# Patient Record
Sex: FEMALE | Race: WHITE | NOT HISPANIC OR LATINO | Employment: OTHER | URBAN - METROPOLITAN AREA
[De-identification: names, ages, dates, MRNs, and addresses within clinical notes are randomized per-mention and may not be internally consistent; named-entity substitution may affect disease eponyms.]

---

## 2017-06-22 ENCOUNTER — RECORDS - HEALTHEAST (OUTPATIENT)
Dept: LAB | Facility: CLINIC | Age: 72
End: 2017-06-22

## 2017-06-22 ENCOUNTER — RECORDS - HEALTHEAST (OUTPATIENT)
Dept: ADMINISTRATIVE | Facility: OTHER | Age: 72
End: 2017-06-22

## 2017-06-22 LAB
CHOLEST SERPL-MCNC: 221 MG/DL
FASTING STATUS PATIENT QL REPORTED: ABNORMAL
HDLC SERPL-MCNC: 60 MG/DL
LDLC SERPL CALC-MCNC: 144 MG/DL
TRIGL SERPL-MCNC: 83 MG/DL

## 2017-06-23 ENCOUNTER — HOSPITAL ENCOUNTER (OUTPATIENT)
Dept: MRI IMAGING | Facility: CLINIC | Age: 72
Discharge: HOME | End: 2017-06-23

## 2017-06-23 DIAGNOSIS — I67.1 CEREBRAL ANEURYSM: ICD-10-CM

## 2017-06-23 LAB — HCV AB SERPL QL IA: NEGATIVE

## 2017-06-26 ENCOUNTER — RECORDS - HEALTHEAST (OUTPATIENT)
Dept: ADMINISTRATIVE | Facility: OTHER | Age: 72
End: 2017-06-26

## 2018-03-21 ENCOUNTER — RECORDS - HEALTHEAST (OUTPATIENT)
Dept: LAB | Facility: CLINIC | Age: 73
End: 2018-03-21

## 2018-03-21 ENCOUNTER — RECORDS - HEALTHEAST (OUTPATIENT)
Dept: ADMINISTRATIVE | Facility: OTHER | Age: 73
End: 2018-03-21

## 2018-03-21 LAB
ALBUMIN SERPL-MCNC: 4 G/DL (ref 3.5–5)
ALP SERPL-CCNC: 74 U/L (ref 45–120)
ALT SERPL W P-5'-P-CCNC: 13 U/L (ref 0–45)
ANION GAP SERPL CALCULATED.3IONS-SCNC: 11 MMOL/L (ref 5–18)
AST SERPL W P-5'-P-CCNC: 13 U/L (ref 0–40)
BILIRUB SERPL-MCNC: 1.4 MG/DL (ref 0–1)
BUN SERPL-MCNC: 10 MG/DL (ref 8–28)
C REACTIVE PROTEIN LHE: 0.2 MG/DL (ref 0–0.8)
CALCIUM SERPL-MCNC: 9.8 MG/DL (ref 8.5–10.5)
CHLORIDE BLD-SCNC: 105 MMOL/L (ref 98–107)
CO2 SERPL-SCNC: 25 MMOL/L (ref 22–31)
CREAT SERPL-MCNC: 0.73 MG/DL (ref 0.6–1.1)
ERYTHROCYTE [SEDIMENTATION RATE] IN BLOOD BY WESTERGREN METHOD: 8 MM/HR (ref 0–20)
GFR SERPL CREATININE-BSD FRML MDRD: >60 ML/MIN/1.73M2
GLUCOSE BLD-MCNC: 98 MG/DL (ref 70–125)
POTASSIUM BLD-SCNC: 4.7 MMOL/L (ref 3.5–5)
PROT SERPL-MCNC: 6.9 G/DL (ref 6–8)
RHEUMATOID FACT SERPL-ACNC: <15 IU/ML (ref 0–30)
SODIUM SERPL-SCNC: 141 MMOL/L (ref 136–145)

## 2018-03-22 LAB — B BURGDOR IGG+IGM SER QL: 0.11 INDEX VALUE

## 2018-03-28 ENCOUNTER — HOSPITAL ENCOUNTER (OUTPATIENT)
Dept: MRI IMAGING | Facility: CLINIC | Age: 73
Discharge: HOME | End: 2018-03-28

## 2018-03-28 DIAGNOSIS — I67.1 CEREBRAL ANEURYSM: ICD-10-CM

## 2018-04-30 ENCOUNTER — RECORDS - HEALTHEAST (OUTPATIENT)
Dept: LAB | Facility: CLINIC | Age: 73
End: 2018-04-30

## 2018-04-30 LAB — POTASSIUM BLD-SCNC: 4.6 MMOL/L (ref 3.5–5)

## 2018-06-06 ENCOUNTER — RECORDS - HEALTHEAST (OUTPATIENT)
Dept: LAB | Facility: CLINIC | Age: 73
End: 2018-06-06

## 2018-06-06 LAB — POTASSIUM BLD-SCNC: 4.3 MMOL/L (ref 3.5–5)

## 2018-09-04 ENCOUNTER — HOSPITAL ENCOUNTER (OUTPATIENT)
Dept: ULTRASOUND IMAGING | Facility: CLINIC | Age: 73
Discharge: HOME | End: 2018-09-04
Attending: FAMILY MEDICINE

## 2018-09-04 ENCOUNTER — RECORDS - HEALTHEAST (OUTPATIENT)
Dept: ADMINISTRATIVE | Facility: OTHER | Age: 73
End: 2018-09-04

## 2018-09-04 ENCOUNTER — RECORDS - HEALTHEAST (OUTPATIENT)
Dept: LAB | Facility: CLINIC | Age: 73
End: 2018-09-04

## 2018-09-04 DIAGNOSIS — M79.662 PAIN OF LEFT CALF: ICD-10-CM

## 2018-09-04 DIAGNOSIS — M79.605 LEFT LEG PAIN: ICD-10-CM

## 2018-09-04 LAB
TSH SERPL DL<=0.005 MIU/L-ACNC: 0.66 UIU/ML (ref 0.3–5)
VIT B12 SERPL-MCNC: 385 PG/ML (ref 213–816)

## 2018-09-27 ENCOUNTER — RECORDS - HEALTHEAST (OUTPATIENT)
Dept: LAB | Facility: CLINIC | Age: 73
End: 2018-09-27

## 2018-09-27 LAB
ALBUMIN SERPL-MCNC: 3.9 G/DL (ref 3.5–5)
ALP SERPL-CCNC: 82 U/L (ref 45–120)
ALT SERPL W P-5'-P-CCNC: 12 U/L (ref 0–45)
ANION GAP SERPL CALCULATED.3IONS-SCNC: 10 MMOL/L (ref 5–18)
AST SERPL W P-5'-P-CCNC: 12 U/L (ref 0–40)
BASOPHILS # BLD AUTO: 0 THOU/UL (ref 0–0.2)
BASOPHILS NFR BLD AUTO: 1 % (ref 0–2)
BILIRUB SERPL-MCNC: 0.9 MG/DL (ref 0–1)
BUN SERPL-MCNC: 12 MG/DL (ref 8–28)
CALCIUM SERPL-MCNC: 9.8 MG/DL (ref 8.5–10.5)
CHLORIDE BLD-SCNC: 108 MMOL/L (ref 98–107)
CHOLEST SERPL-MCNC: 213 MG/DL
CO2 SERPL-SCNC: 26 MMOL/L (ref 22–31)
CREAT SERPL-MCNC: 0.79 MG/DL (ref 0.6–1.1)
EOSINOPHIL # BLD AUTO: 0.2 THOU/UL (ref 0–0.4)
EOSINOPHIL NFR BLD AUTO: 3 % (ref 0–6)
ERYTHROCYTE [DISTWIDTH] IN BLOOD BY AUTOMATED COUNT: 12.8 % (ref 11–14.5)
FASTING STATUS PATIENT QL REPORTED: YES
GFR SERPL CREATININE-BSD FRML MDRD: >60 ML/MIN/1.73M2
GLUCOSE BLD-MCNC: 94 MG/DL (ref 70–125)
HCT VFR BLD AUTO: 40.6 % (ref 35–47)
HDLC SERPL-MCNC: 69 MG/DL
HGB BLD-MCNC: 12.8 G/DL (ref 12–16)
LDLC SERPL CALC-MCNC: 132 MG/DL
LYMPHOCYTES # BLD AUTO: 1.8 THOU/UL (ref 0.8–4.4)
LYMPHOCYTES NFR BLD AUTO: 28 % (ref 20–40)
MCH RBC QN AUTO: 29.2 PG (ref 27–34)
MCHC RBC AUTO-ENTMCNC: 31.5 G/DL (ref 32–36)
MCV RBC AUTO: 93 FL (ref 80–100)
MONOCYTES # BLD AUTO: 0.4 THOU/UL (ref 0–0.9)
MONOCYTES NFR BLD AUTO: 7 % (ref 2–10)
NEUTROPHILS # BLD AUTO: 4 THOU/UL (ref 2–7.7)
NEUTROPHILS NFR BLD AUTO: 62 % (ref 50–70)
PLATELET # BLD AUTO: 293 THOU/UL (ref 140–440)
PMV BLD AUTO: 9.6 FL (ref 8.5–12.5)
POTASSIUM BLD-SCNC: 4.2 MMOL/L (ref 3.5–5)
PROT SERPL-MCNC: 6.5 G/DL (ref 6–8)
RBC # BLD AUTO: 4.38 MILL/UL (ref 3.8–5.4)
SODIUM SERPL-SCNC: 144 MMOL/L (ref 136–145)
TRIGL SERPL-MCNC: 58 MG/DL
WBC: 6.4 THOU/UL (ref 4–11)

## 2019-04-08 ENCOUNTER — RECORDS - HEALTHEAST (OUTPATIENT)
Dept: LAB | Facility: CLINIC | Age: 74
End: 2019-04-08

## 2019-04-08 LAB
ANION GAP SERPL CALCULATED.3IONS-SCNC: 10 MMOL/L (ref 5–18)
BUN SERPL-MCNC: 11 MG/DL (ref 8–28)
CALCIUM SERPL-MCNC: 10.2 MG/DL (ref 8.5–10.5)
CHLORIDE BLD-SCNC: 107 MMOL/L (ref 98–107)
CO2 SERPL-SCNC: 26 MMOL/L (ref 22–31)
CREAT SERPL-MCNC: 0.81 MG/DL (ref 0.6–1.1)
GFR SERPL CREATININE-BSD FRML MDRD: >60 ML/MIN/1.73M2
GLUCOSE BLD-MCNC: 83 MG/DL (ref 70–125)
POTASSIUM BLD-SCNC: 4 MMOL/L (ref 3.5–5)
SODIUM SERPL-SCNC: 143 MMOL/L (ref 136–145)

## 2019-07-01 ENCOUNTER — RECORDS - HEALTHEAST (OUTPATIENT)
Dept: LAB | Facility: CLINIC | Age: 74
End: 2019-07-01

## 2019-07-01 LAB
C REACTIVE PROTEIN LHE: 0.3 MG/DL (ref 0–0.8)
ERYTHROCYTE [SEDIMENTATION RATE] IN BLOOD BY WESTERGREN METHOD: 8 MM/HR (ref 0–20)
RHEUMATOID FACT SERPL-ACNC: <15 IU/ML (ref 0–30)
URATE SERPL-MCNC: 4.3 MG/DL (ref 2–7.5)

## 2019-07-02 LAB
ANA SER QL: 1.1 U
B BURGDOR IGG+IGM SER QL: 0.12 INDEX VALUE
CCP AB SER IA-ACNC: <0.5 U/ML

## 2019-07-05 LAB
B LOCUS: NORMAL
B27TEST METHOD: NORMAL

## 2019-10-01 ENCOUNTER — RECORDS - HEALTHEAST (OUTPATIENT)
Dept: LAB | Facility: CLINIC | Age: 74
End: 2019-10-01

## 2019-10-01 LAB
ALBUMIN SERPL-MCNC: 3.8 G/DL (ref 3.5–5)
ALP SERPL-CCNC: 79 U/L (ref 45–120)
ALT SERPL W P-5'-P-CCNC: 11 U/L (ref 0–45)
ANION GAP SERPL CALCULATED.3IONS-SCNC: 7 MMOL/L (ref 5–18)
AST SERPL W P-5'-P-CCNC: 12 U/L (ref 0–40)
BILIRUB SERPL-MCNC: 1.2 MG/DL (ref 0–1)
BUN SERPL-MCNC: 11 MG/DL (ref 8–28)
CALCIUM SERPL-MCNC: 9.5 MG/DL (ref 8.5–10.5)
CHLORIDE BLD-SCNC: 107 MMOL/L (ref 98–107)
CHOLEST SERPL-MCNC: 191 MG/DL
CO2 SERPL-SCNC: 27 MMOL/L (ref 22–31)
CREAT SERPL-MCNC: 0.84 MG/DL (ref 0.6–1.1)
FASTING STATUS PATIENT QL REPORTED: YES
GFR SERPL CREATININE-BSD FRML MDRD: >60 ML/MIN/1.73M2
GLUCOSE BLD-MCNC: 98 MG/DL (ref 70–125)
HDLC SERPL-MCNC: 68 MG/DL
LDLC SERPL CALC-MCNC: 104 MG/DL
POTASSIUM BLD-SCNC: 4 MMOL/L (ref 3.5–5)
PROT SERPL-MCNC: 6.2 G/DL (ref 6–8)
SODIUM SERPL-SCNC: 141 MMOL/L (ref 136–145)
TRIGL SERPL-MCNC: 97 MG/DL
TSH SERPL DL<=0.005 MIU/L-ACNC: 0.54 UIU/ML (ref 0.3–5)

## 2020-01-06 ENCOUNTER — RECORDS - HEALTHEAST (OUTPATIENT)
Dept: LAB | Facility: CLINIC | Age: 75
End: 2020-01-06

## 2020-01-06 ENCOUNTER — HOSPITAL ENCOUNTER (OUTPATIENT)
Dept: MRI IMAGING | Facility: CLINIC | Age: 75
Discharge: HOME | End: 2020-01-06
Attending: FAMILY MEDICINE

## 2020-01-06 ENCOUNTER — RECORDS - HEALTHEAST (OUTPATIENT)
Dept: ADMINISTRATIVE | Facility: OTHER | Age: 75
End: 2020-01-06

## 2020-01-06 DIAGNOSIS — I67.1 CEREBRAL ANEURYSM, NONRUPTURED: ICD-10-CM

## 2020-01-06 LAB
ALBUMIN SERPL-MCNC: 3.7 G/DL (ref 3.5–5)
ALP SERPL-CCNC: 67 U/L (ref 45–120)
ALT SERPL W P-5'-P-CCNC: 10 U/L (ref 0–45)
ANION GAP SERPL CALCULATED.3IONS-SCNC: 7 MMOL/L (ref 5–18)
AST SERPL W P-5'-P-CCNC: 11 U/L (ref 0–40)
BILIRUB SERPL-MCNC: 1.1 MG/DL (ref 0–1)
BUN SERPL-MCNC: 14 MG/DL (ref 8–28)
CALCIUM SERPL-MCNC: 9.1 MG/DL (ref 8.5–10.5)
CHLORIDE BLD-SCNC: 108 MMOL/L (ref 98–107)
CO2 SERPL-SCNC: 26 MMOL/L (ref 22–31)
CREAT SERPL-MCNC: 0.85 MG/DL (ref 0.6–1.1)
ERYTHROCYTE [SEDIMENTATION RATE] IN BLOOD BY WESTERGREN METHOD: 6 MM/HR (ref 0–20)
GFR SERPL CREATININE-BSD FRML MDRD: >60 ML/MIN/1.73M2
GLUCOSE BLD-MCNC: 99 MG/DL (ref 70–125)
LIPASE SERPL-CCNC: 145 U/L (ref 0–52)
POTASSIUM BLD-SCNC: 3.8 MMOL/L (ref 3.5–5)
PROT SERPL-MCNC: 6 G/DL (ref 6–8)
SODIUM SERPL-SCNC: 141 MMOL/L (ref 136–145)

## 2020-02-24 ENCOUNTER — RECORDS - HEALTHEAST (OUTPATIENT)
Dept: LAB | Facility: CLINIC | Age: 75
End: 2020-02-24

## 2020-02-24 LAB
C REACTIVE PROTEIN LHE: 0.2 MG/DL (ref 0–0.8)
ERYTHROCYTE [SEDIMENTATION RATE] IN BLOOD BY WESTERGREN METHOD: 8 MM/HR (ref 0–20)

## 2020-05-15 ENCOUNTER — RECORDS - HEALTHEAST (OUTPATIENT)
Dept: LAB | Facility: CLINIC | Age: 75
End: 2020-05-15

## 2020-05-15 LAB
ALBUMIN SERPL-MCNC: 3.7 G/DL (ref 3.5–5)
ALP SERPL-CCNC: 72 U/L (ref 45–120)
ALT SERPL W P-5'-P-CCNC: 16 U/L (ref 0–45)
ANION GAP SERPL CALCULATED.3IONS-SCNC: 9 MMOL/L (ref 5–18)
AST SERPL W P-5'-P-CCNC: 13 U/L (ref 0–40)
BILIRUB SERPL-MCNC: 0.6 MG/DL (ref 0–1)
BUN SERPL-MCNC: 9 MG/DL (ref 8–28)
CALCIUM SERPL-MCNC: 9.6 MG/DL (ref 8.5–10.5)
CHLORIDE BLD-SCNC: 107 MMOL/L (ref 98–107)
CO2 SERPL-SCNC: 26 MMOL/L (ref 22–31)
CREAT SERPL-MCNC: 0.81 MG/DL (ref 0.6–1.1)
GFR SERPL CREATININE-BSD FRML MDRD: >60 ML/MIN/1.73M2
GLUCOSE BLD-MCNC: 80 MG/DL (ref 70–125)
LIPASE SERPL-CCNC: 25 U/L (ref 0–52)
POTASSIUM BLD-SCNC: 4.3 MMOL/L (ref 3.5–5)
PROT SERPL-MCNC: 6.1 G/DL (ref 6–8)
SODIUM SERPL-SCNC: 142 MMOL/L (ref 136–145)

## 2020-08-26 ENCOUNTER — RECORDS - HEALTHEAST (OUTPATIENT)
Dept: LAB | Facility: CLINIC | Age: 75
End: 2020-08-26

## 2020-08-26 LAB
ALBUMIN SERPL-MCNC: 4.1 G/DL (ref 3.5–5)
ALP SERPL-CCNC: 75 U/L (ref 45–120)
ALT SERPL W P-5'-P-CCNC: 16 U/L (ref 0–45)
ANION GAP SERPL CALCULATED.3IONS-SCNC: 11 MMOL/L (ref 5–18)
AST SERPL W P-5'-P-CCNC: 14 U/L (ref 0–40)
BILIRUB SERPL-MCNC: 0.6 MG/DL (ref 0–1)
BUN SERPL-MCNC: 12 MG/DL (ref 8–28)
CALCIUM SERPL-MCNC: 9.6 MG/DL (ref 8.5–10.5)
CHLORIDE BLD-SCNC: 105 MMOL/L (ref 98–107)
CO2 SERPL-SCNC: 24 MMOL/L (ref 22–31)
CREAT SERPL-MCNC: 0.82 MG/DL (ref 0.6–1.1)
D DIMER PPP FEU-MCNC: 0.55 FEU UG/ML
GFR SERPL CREATININE-BSD FRML MDRD: >60 ML/MIN/1.73M2
GLUCOSE BLD-MCNC: 99 MG/DL (ref 70–125)
POTASSIUM BLD-SCNC: 4.4 MMOL/L (ref 3.5–5)
PROT SERPL-MCNC: 6.8 G/DL (ref 6–8)
SODIUM SERPL-SCNC: 140 MMOL/L (ref 136–145)

## 2020-10-12 ENCOUNTER — RECORDS - HEALTHEAST (OUTPATIENT)
Dept: LAB | Facility: CLINIC | Age: 75
End: 2020-10-12

## 2020-10-12 LAB
ALBUMIN SERPL-MCNC: 4.1 G/DL (ref 3.5–5)
ALP SERPL-CCNC: 81 U/L (ref 45–120)
ALT SERPL W P-5'-P-CCNC: 15 U/L (ref 0–45)
ANION GAP SERPL CALCULATED.3IONS-SCNC: 10 MMOL/L (ref 5–18)
AST SERPL W P-5'-P-CCNC: 14 U/L (ref 0–40)
BILIRUB SERPL-MCNC: 0.6 MG/DL (ref 0–1)
BUN SERPL-MCNC: 14 MG/DL (ref 8–28)
CALCIUM SERPL-MCNC: 9.7 MG/DL (ref 8.5–10.5)
CHLORIDE BLD-SCNC: 103 MMOL/L (ref 98–107)
CHOLEST SERPL-MCNC: 209 MG/DL
CO2 SERPL-SCNC: 26 MMOL/L (ref 22–31)
CREAT SERPL-MCNC: 0.8 MG/DL (ref 0.6–1.1)
FASTING STATUS PATIENT QL REPORTED: NO
GFR SERPL CREATININE-BSD FRML MDRD: >60 ML/MIN/1.73M2
GLUCOSE BLD-MCNC: 98 MG/DL (ref 70–125)
HDLC SERPL-MCNC: 71 MG/DL
LDLC SERPL CALC-MCNC: 118 MG/DL
LIPASE SERPL-CCNC: 22 U/L (ref 0–52)
POTASSIUM BLD-SCNC: 4.8 MMOL/L (ref 3.5–5)
PROT SERPL-MCNC: 6.6 G/DL (ref 6–8)
SODIUM SERPL-SCNC: 139 MMOL/L (ref 136–145)
TRIGL SERPL-MCNC: 100 MG/DL
TSH SERPL DL<=0.005 MIU/L-ACNC: 0.74 UIU/ML (ref 0.3–5)

## 2020-10-13 LAB — 25(OH)D3 SERPL-MCNC: 38.7 NG/ML (ref 30–80)

## 2021-04-22 ENCOUNTER — RECORDS - HEALTHEAST (OUTPATIENT)
Dept: LAB | Facility: CLINIC | Age: 76
End: 2021-04-22

## 2021-04-22 LAB
ALBUMIN SERPL-MCNC: 4.2 G/DL (ref 3.5–5)
ALP SERPL-CCNC: 92 U/L (ref 45–120)
ALT SERPL W P-5'-P-CCNC: 14 U/L (ref 0–45)
ANION GAP SERPL CALCULATED.3IONS-SCNC: 11 MMOL/L (ref 5–18)
AST SERPL W P-5'-P-CCNC: 13 U/L (ref 0–40)
BILIRUB SERPL-MCNC: 0.9 MG/DL (ref 0–1)
BUN SERPL-MCNC: 12 MG/DL (ref 8–28)
CALCIUM SERPL-MCNC: 10 MG/DL (ref 8.5–10.5)
CHLORIDE BLD-SCNC: 102 MMOL/L (ref 98–107)
CO2 SERPL-SCNC: 25 MMOL/L (ref 22–31)
CREAT SERPL-MCNC: 0.85 MG/DL (ref 0.6–1.1)
D DIMER PPP FEU-MCNC: 0.64 FEU UG/ML
GFR SERPL CREATININE-BSD FRML MDRD: >60 ML/MIN/1.73M2
GLUCOSE BLD-MCNC: 86 MG/DL (ref 70–125)
POTASSIUM BLD-SCNC: 4.2 MMOL/L (ref 3.5–5)
PROT SERPL-MCNC: 7 G/DL (ref 6–8)
SODIUM SERPL-SCNC: 138 MMOL/L (ref 136–145)
TSH SERPL DL<=0.005 MIU/L-ACNC: 0.7 UIU/ML (ref 0.3–5)

## 2021-04-23 ENCOUNTER — RECORDS - HEALTHEAST (OUTPATIENT)
Dept: ADMINISTRATIVE | Facility: OTHER | Age: 76
End: 2021-04-23

## 2021-04-27 ENCOUNTER — RECORDS - HEALTHEAST (OUTPATIENT)
Dept: ADMINISTRATIVE | Facility: OTHER | Age: 76
End: 2021-04-27

## 2021-04-28 ENCOUNTER — RECORDS - HEALTHEAST (OUTPATIENT)
Dept: ADMINISTRATIVE | Facility: OTHER | Age: 76
End: 2021-04-28

## 2021-04-29 ENCOUNTER — HOSPITAL ENCOUNTER (OUTPATIENT)
Dept: CARDIOLOGY | Facility: CLINIC | Age: 76
Discharge: HOME | End: 2021-04-29

## 2021-04-29 DIAGNOSIS — R07.2 SUBSTERNAL CHEST PAIN: ICD-10-CM

## 2021-04-29 LAB
CV STRESS CURRENT BP HE: NORMAL
CV STRESS CURRENT HR HE: 101
CV STRESS CURRENT HR HE: 101
CV STRESS CURRENT HR HE: 106
CV STRESS CURRENT HR HE: 108
CV STRESS CURRENT HR HE: 113
CV STRESS CURRENT HR HE: 113
CV STRESS CURRENT HR HE: 126
CV STRESS CURRENT HR HE: 135
CV STRESS CURRENT HR HE: 137
CV STRESS CURRENT HR HE: 137
CV STRESS CURRENT HR HE: 143
CV STRESS CURRENT HR HE: 146
CV STRESS CURRENT HR HE: 149
CV STRESS CURRENT HR HE: 149
CV STRESS CURRENT HR HE: 154
CV STRESS CURRENT HR HE: 155
CV STRESS CURRENT HR HE: 155
CV STRESS CURRENT HR HE: 86
CV STRESS CURRENT HR HE: 95
CV STRESS CURRENT HR HE: 95
CV STRESS CURRENT HR HE: 96
CV STRESS CURRENT HR HE: 96
CV STRESS CURRENT HR HE: 98
CV STRESS DEVIATION TIME HE: NORMAL
CV STRESS ECHO PERCENT HR HE: NORMAL
CV STRESS EXERCISE STAGE HE: NORMAL
CV STRESS FINAL RESTING BP HE: NORMAL
CV STRESS FINAL RESTING HR HE: 96
CV STRESS MAX HR HE: 159
CV STRESS MAX TREADMILL GRADE HE: 12
CV STRESS MAX TREADMILL SPEED HE: 2.5
CV STRESS PEAK DIA BP HE: NORMAL
CV STRESS PEAK SYS BP HE: NORMAL
CV STRESS PHASE HE: NORMAL
CV STRESS PROTOCOL HE: NORMAL
CV STRESS RESTING PT POSITION HE: NORMAL
CV STRESS RESTING PT POSITION HE: NORMAL
CV STRESS ST DEVIATION AMOUNT HE: NORMAL
CV STRESS ST DEVIATION ELEVATION HE: NORMAL
CV STRESS ST EVELATION AMOUNT HE: NORMAL
CV STRESS TEST TYPE HE: NORMAL
CV STRESS TOTAL STAGE TIME MIN 1 HE: NORMAL
RATE PRESSURE PRODUCT: NORMAL
STRESS ECHO BASELINE DIASTOLIC HE: 101
STRESS ECHO BASELINE HR: 86
STRESS ECHO BASELINE SYSTOLIC BP: 176
STRESS ECHO CALCULATED PERCENT HR: 110 %
STRESS ECHO LAST STRESS DIASTOLIC BP: 90
STRESS ECHO LAST STRESS HR: 155
STRESS ECHO LAST STRESS SYSTOLIC BP: 170
STRESS ECHO POST ESTIMATED WORKLOAD: 6
STRESS ECHO POST EXERCISE DUR MIN: 4
STRESS ECHO POST EXERCISE DUR SEC: 15
STRESS ECHO TARGET HR: 145

## 2021-04-29 RX ORDER — TRIAMTERENE AND HYDROCHLOROTHIAZIDE 37.5; 25 MG/1; MG/1
1 CAPSULE ORAL EVERY MORNING
Status: SHIPPED | COMMUNITY
Start: 2021-04-29

## 2021-04-29 RX ORDER — ESTRADIOL 0.1 MG/G
2 CREAM VAGINAL DAILY
Status: SHIPPED | COMMUNITY
Start: 2021-04-29

## 2021-04-29 RX ORDER — LOSARTAN POTASSIUM 100 MG/1
100 TABLET ORAL DAILY
Status: SHIPPED | COMMUNITY
Start: 2021-04-29

## 2021-04-29 RX ORDER — AMLODIPINE BESYLATE 5 MG/1
5 TABLET ORAL DAILY
Status: SHIPPED | COMMUNITY
Start: 2021-04-29

## 2021-04-29 RX ORDER — DOCUSATE SODIUM 100 MG/1
100 CAPSULE, LIQUID FILLED ORAL 2 TIMES DAILY
Status: SHIPPED | COMMUNITY
Start: 2021-04-29

## 2021-05-22 ENCOUNTER — HEALTH MAINTENANCE LETTER (OUTPATIENT)
Age: 76
End: 2021-05-22

## 2021-05-26 ENCOUNTER — RECORDS - HEALTHEAST (OUTPATIENT)
Dept: ADMINISTRATIVE | Facility: CLINIC | Age: 76
End: 2021-05-26

## 2021-05-29 ENCOUNTER — RECORDS - HEALTHEAST (OUTPATIENT)
Dept: ADMINISTRATIVE | Facility: CLINIC | Age: 76
End: 2021-05-29

## 2021-06-01 ENCOUNTER — RECORDS - HEALTHEAST (OUTPATIENT)
Dept: ADMINISTRATIVE | Facility: CLINIC | Age: 76
End: 2021-06-01

## 2021-06-15 ENCOUNTER — RECORDS - HEALTHEAST (OUTPATIENT)
Dept: LAB | Facility: CLINIC | Age: 76
End: 2021-06-15

## 2021-06-15 LAB
ERYTHROCYTE [SEDIMENTATION RATE] IN BLOOD BY WESTERGREN METHOD: 13 MM/HR (ref 0–20)
FOLATE SERPL-MCNC: 10 NG/ML
VIT B12 SERPL-MCNC: 371 PG/ML (ref 213–816)

## 2021-09-11 ENCOUNTER — HEALTH MAINTENANCE LETTER (OUTPATIENT)
Age: 76
End: 2021-09-11

## 2021-11-09 ENCOUNTER — LAB REQUISITION (OUTPATIENT)
Dept: LAB | Facility: CLINIC | Age: 76
End: 2021-11-09

## 2021-11-09 DIAGNOSIS — E78.5 HYPERLIPIDEMIA, UNSPECIFIED: ICD-10-CM

## 2021-11-09 DIAGNOSIS — R89.9 UNSPECIFIED ABNORMAL FINDING IN SPECIMENS FROM OTHER ORGANS, SYSTEMS AND TISSUES: ICD-10-CM

## 2021-11-09 DIAGNOSIS — I10 ESSENTIAL (PRIMARY) HYPERTENSION: ICD-10-CM

## 2021-11-09 DIAGNOSIS — E03.9 HYPOTHYROIDISM, UNSPECIFIED: ICD-10-CM

## 2021-11-09 LAB
ALBUMIN SERPL-MCNC: 4 G/DL (ref 3.5–5)
ALP SERPL-CCNC: 84 U/L (ref 45–120)
ALT SERPL W P-5'-P-CCNC: 38 U/L (ref 0–45)
AMYLASE SERPL-CCNC: 52 U/L (ref 5–120)
ANION GAP SERPL CALCULATED.3IONS-SCNC: 12 MMOL/L (ref 5–18)
AST SERPL W P-5'-P-CCNC: 20 U/L (ref 0–40)
BILIRUB SERPL-MCNC: 0.9 MG/DL (ref 0–1)
BUN SERPL-MCNC: 11 MG/DL (ref 8–28)
CALCIUM SERPL-MCNC: 9.9 MG/DL (ref 8.5–10.5)
CHLORIDE BLD-SCNC: 104 MMOL/L (ref 98–107)
CHOLEST SERPL-MCNC: 190 MG/DL
CO2 SERPL-SCNC: 23 MMOL/L (ref 22–31)
CREAT SERPL-MCNC: 0.83 MG/DL (ref 0.6–1.1)
GFR SERPL CREATININE-BSD FRML MDRD: 69 ML/MIN/1.73M2
GLUCOSE BLD-MCNC: 99 MG/DL (ref 70–125)
HDLC SERPL-MCNC: 59 MG/DL
LDLC SERPL CALC-MCNC: 114 MG/DL
LIPASE SERPL-CCNC: 20 U/L (ref 0–52)
POTASSIUM BLD-SCNC: 4.7 MMOL/L (ref 3.5–5)
PROT SERPL-MCNC: 6.7 G/DL (ref 6–8)
SODIUM SERPL-SCNC: 139 MMOL/L (ref 136–145)
TRIGL SERPL-MCNC: 86 MG/DL
TSH SERPL DL<=0.005 MIU/L-ACNC: 0.82 UIU/ML (ref 0.3–5)

## 2021-11-09 PROCEDURE — 84443 ASSAY THYROID STIM HORMONE: CPT | Performed by: FAMILY MEDICINE

## 2021-11-09 PROCEDURE — 83690 ASSAY OF LIPASE: CPT | Performed by: FAMILY MEDICINE

## 2021-11-09 PROCEDURE — 82040 ASSAY OF SERUM ALBUMIN: CPT | Performed by: FAMILY MEDICINE

## 2021-11-09 PROCEDURE — 80061 LIPID PANEL: CPT | Performed by: FAMILY MEDICINE

## 2021-11-09 PROCEDURE — 82150 ASSAY OF AMYLASE: CPT | Performed by: FAMILY MEDICINE

## 2021-12-24 ENCOUNTER — HOSPITAL ENCOUNTER (OUTPATIENT)
Dept: MRI IMAGING | Facility: CLINIC | Age: 76
Discharge: HOME | End: 2021-12-24
Attending: FAMILY MEDICINE | Admitting: FAMILY MEDICINE
Payer: COMMERCIAL

## 2021-12-24 DIAGNOSIS — I67.1 CEREBRAL ANEURYSM: ICD-10-CM

## 2021-12-24 PROCEDURE — 70544 MR ANGIOGRAPHY HEAD W/O DYE: CPT

## 2022-04-15 ENCOUNTER — LAB REQUISITION (OUTPATIENT)
Dept: LAB | Facility: CLINIC | Age: 77
End: 2022-04-15
Payer: COMMERCIAL

## 2022-04-15 DIAGNOSIS — R53.83 OTHER FATIGUE: ICD-10-CM

## 2022-04-15 DIAGNOSIS — M79.10 MYALGIA, UNSPECIFIED SITE: ICD-10-CM

## 2022-04-15 LAB
ALBUMIN SERPL-MCNC: 4.2 G/DL (ref 3.5–5)
ALP SERPL-CCNC: 73 U/L (ref 45–120)
ALT SERPL W P-5'-P-CCNC: 16 U/L (ref 0–45)
ANION GAP SERPL CALCULATED.3IONS-SCNC: 12 MMOL/L (ref 5–18)
AST SERPL W P-5'-P-CCNC: 14 U/L (ref 0–40)
BILIRUB SERPL-MCNC: 1 MG/DL (ref 0–1)
BUN SERPL-MCNC: 12 MG/DL (ref 8–28)
CALCIUM SERPL-MCNC: 9.8 MG/DL (ref 8.5–10.5)
CHLORIDE BLD-SCNC: 107 MMOL/L (ref 98–107)
CK SERPL-CCNC: 51 U/L (ref 30–190)
CO2 SERPL-SCNC: 25 MMOL/L (ref 22–31)
CREAT SERPL-MCNC: 0.81 MG/DL (ref 0.6–1.1)
CRP SERPL HS-MCNC: 2 MG/L (ref 0–3)
GFR SERPL CREATININE-BSD FRML MDRD: 75 ML/MIN/1.73M2
GLUCOSE BLD-MCNC: 91 MG/DL (ref 70–125)
POTASSIUM BLD-SCNC: 4.2 MMOL/L (ref 3.5–5)
PROT SERPL-MCNC: 6.6 G/DL (ref 6–8)
SODIUM SERPL-SCNC: 144 MMOL/L (ref 136–145)
TSH SERPL DL<=0.005 MIU/L-ACNC: 0.88 UIU/ML (ref 0.3–5)
VIT B12 SERPL-MCNC: 395 PG/ML (ref 213–816)

## 2022-04-15 PROCEDURE — 82550 ASSAY OF CK (CPK): CPT | Mod: ORL | Performed by: FAMILY MEDICINE

## 2022-04-15 PROCEDURE — 82607 VITAMIN B-12: CPT | Mod: ORL | Performed by: FAMILY MEDICINE

## 2022-04-15 PROCEDURE — 80053 COMPREHEN METABOLIC PANEL: CPT | Mod: ORL | Performed by: FAMILY MEDICINE

## 2022-04-15 PROCEDURE — 84443 ASSAY THYROID STIM HORMONE: CPT | Mod: ORL | Performed by: FAMILY MEDICINE

## 2022-04-15 PROCEDURE — 86141 C-REACTIVE PROTEIN HS: CPT | Mod: ORL | Performed by: FAMILY MEDICINE

## 2022-05-31 ENCOUNTER — ANCILLARY PROCEDURE (OUTPATIENT)
Dept: BONE DENSITY | Facility: CLINIC | Age: 77
End: 2022-05-31
Attending: FAMILY MEDICINE
Payer: COMMERCIAL

## 2022-05-31 DIAGNOSIS — Z78.0 POSTMENOPAUSAL: ICD-10-CM

## 2022-05-31 PROCEDURE — 77080 DXA BONE DENSITY AXIAL: CPT | Mod: TC | Performed by: RADIOLOGY

## 2022-06-17 ENCOUNTER — LAB REQUISITION (OUTPATIENT)
Dept: LAB | Facility: CLINIC | Age: 77
End: 2022-06-17
Payer: COMMERCIAL

## 2022-06-17 DIAGNOSIS — I10 ESSENTIAL (PRIMARY) HYPERTENSION: ICD-10-CM

## 2022-06-17 LAB
ANION GAP SERPL CALCULATED.3IONS-SCNC: 10 MMOL/L (ref 5–18)
BUN SERPL-MCNC: 13 MG/DL (ref 8–28)
CALCIUM SERPL-MCNC: 9.7 MG/DL (ref 8.5–10.5)
CHLORIDE BLD-SCNC: 104 MMOL/L (ref 98–107)
CO2 SERPL-SCNC: 26 MMOL/L (ref 22–31)
CREAT SERPL-MCNC: 0.79 MG/DL (ref 0.6–1.1)
GFR SERPL CREATININE-BSD FRML MDRD: 77 ML/MIN/1.73M2
GLUCOSE BLD-MCNC: 95 MG/DL (ref 70–125)
POTASSIUM BLD-SCNC: 4 MMOL/L (ref 3.5–5)
SODIUM SERPL-SCNC: 140 MMOL/L (ref 136–145)

## 2022-06-17 PROCEDURE — 80048 BASIC METABOLIC PNL TOTAL CA: CPT | Mod: ORL | Performed by: FAMILY MEDICINE

## 2022-06-18 ENCOUNTER — HEALTH MAINTENANCE LETTER (OUTPATIENT)
Age: 77
End: 2022-06-18

## 2022-07-19 ENCOUNTER — LAB REQUISITION (OUTPATIENT)
Dept: LAB | Facility: CLINIC | Age: 77
End: 2022-07-19
Payer: COMMERCIAL

## 2022-07-19 DIAGNOSIS — J34.89 OTHER SPECIFIED DISORDERS OF NOSE AND NASAL SINUSES: ICD-10-CM

## 2022-07-19 PROCEDURE — 87081 CULTURE SCREEN ONLY: CPT | Mod: ORL | Performed by: FAMILY MEDICINE

## 2022-07-19 PROCEDURE — U0003 INFECTIOUS AGENT DETECTION BY NUCLEIC ACID (DNA OR RNA); SEVERE ACUTE RESPIRATORY SYNDROME CORONAVIRUS 2 (SARS-COV-2) (CORONAVIRUS DISEASE [COVID-19]), AMPLIFIED PROBE TECHNIQUE, MAKING USE OF HIGH THROUGHPUT TECHNOLOGIES AS DESCRIBED BY CMS-2020-01-R: HCPCS | Mod: ORL | Performed by: FAMILY MEDICINE

## 2022-07-20 LAB — SARS-COV-2 RNA RESP QL NAA+PROBE: POSITIVE

## 2022-07-22 LAB — BACTERIA SPEC CULT: NORMAL

## 2022-10-30 ENCOUNTER — HEALTH MAINTENANCE LETTER (OUTPATIENT)
Age: 77
End: 2022-10-30

## 2022-11-11 ENCOUNTER — LAB REQUISITION (OUTPATIENT)
Dept: LAB | Facility: CLINIC | Age: 77
End: 2022-11-11
Payer: COMMERCIAL

## 2022-11-11 DIAGNOSIS — I10 ESSENTIAL (PRIMARY) HYPERTENSION: ICD-10-CM

## 2022-11-11 DIAGNOSIS — E78.00 PURE HYPERCHOLESTEROLEMIA, UNSPECIFIED: ICD-10-CM

## 2022-11-11 DIAGNOSIS — G62.9 POLYNEUROPATHY, UNSPECIFIED: ICD-10-CM

## 2022-11-11 DIAGNOSIS — E04.2 NONTOXIC MULTINODULAR GOITER: ICD-10-CM

## 2022-11-11 DIAGNOSIS — R35.0 FREQUENCY OF MICTURITION: ICD-10-CM

## 2022-11-11 LAB
ANION GAP SERPL CALCULATED.3IONS-SCNC: 14 MMOL/L (ref 7–15)
BUN SERPL-MCNC: 17.4 MG/DL (ref 8–23)
CALCIUM SERPL-MCNC: 9.9 MG/DL (ref 8.8–10.2)
CHLORIDE SERPL-SCNC: 103 MMOL/L (ref 98–107)
CHOLEST SERPL-MCNC: 205 MG/DL
CREAT SERPL-MCNC: 1.13 MG/DL (ref 0.51–0.95)
DEPRECATED HCO3 PLAS-SCNC: 24 MMOL/L (ref 22–29)
GFR SERPL CREATININE-BSD FRML MDRD: 50 ML/MIN/1.73M2
GLUCOSE SERPL-MCNC: 100 MG/DL (ref 70–99)
HDLC SERPL-MCNC: 70 MG/DL
LDLC SERPL CALC-MCNC: 124 MG/DL
NONHDLC SERPL-MCNC: 135 MG/DL
POTASSIUM SERPL-SCNC: 4.4 MMOL/L (ref 3.4–5.3)
SODIUM SERPL-SCNC: 141 MMOL/L (ref 136–145)
TRIGL SERPL-MCNC: 57 MG/DL
TSH SERPL DL<=0.005 MIU/L-ACNC: 1.05 UIU/ML (ref 0.3–4.2)
VIT B12 SERPL-MCNC: 385 PG/ML (ref 232–1245)

## 2022-11-11 PROCEDURE — 80048 BASIC METABOLIC PNL TOTAL CA: CPT | Mod: ORL | Performed by: FAMILY MEDICINE

## 2022-11-11 PROCEDURE — 82607 VITAMIN B-12: CPT | Mod: ORL | Performed by: FAMILY MEDICINE

## 2022-11-11 PROCEDURE — 87086 URINE CULTURE/COLONY COUNT: CPT | Mod: ORL | Performed by: FAMILY MEDICINE

## 2022-11-11 PROCEDURE — 80061 LIPID PANEL: CPT | Mod: ORL | Performed by: FAMILY MEDICINE

## 2022-11-11 PROCEDURE — 84443 ASSAY THYROID STIM HORMONE: CPT | Mod: ORL | Performed by: FAMILY MEDICINE

## 2022-11-13 LAB — BACTERIA UR CULT: NORMAL

## 2022-12-27 ENCOUNTER — LAB REQUISITION (OUTPATIENT)
Dept: LAB | Facility: CLINIC | Age: 77
End: 2022-12-27
Payer: COMMERCIAL

## 2022-12-27 DIAGNOSIS — I10 ESSENTIAL (PRIMARY) HYPERTENSION: ICD-10-CM

## 2022-12-27 LAB
ANION GAP SERPL CALCULATED.3IONS-SCNC: 14 MMOL/L (ref 7–15)
BUN SERPL-MCNC: 15.1 MG/DL (ref 8–23)
CALCIUM SERPL-MCNC: 9.8 MG/DL (ref 8.8–10.2)
CHLORIDE SERPL-SCNC: 104 MMOL/L (ref 98–107)
CREAT SERPL-MCNC: 0.88 MG/DL (ref 0.51–0.95)
DEPRECATED HCO3 PLAS-SCNC: 24 MMOL/L (ref 22–29)
GFR SERPL CREATININE-BSD FRML MDRD: 67 ML/MIN/1.73M2
GLUCOSE SERPL-MCNC: 124 MG/DL (ref 70–99)
POTASSIUM SERPL-SCNC: 4.1 MMOL/L (ref 3.4–5.3)
SODIUM SERPL-SCNC: 142 MMOL/L (ref 136–145)

## 2022-12-27 PROCEDURE — 80048 BASIC METABOLIC PNL TOTAL CA: CPT | Mod: ORL | Performed by: FAMILY MEDICINE

## 2023-04-20 ENCOUNTER — LAB REQUISITION (OUTPATIENT)
Dept: LAB | Facility: CLINIC | Age: 78
End: 2023-04-20
Payer: COMMERCIAL

## 2023-04-20 DIAGNOSIS — R52 PAIN, UNSPECIFIED: ICD-10-CM

## 2023-04-20 LAB
ALBUMIN SERPL BCG-MCNC: 4.7 G/DL (ref 3.5–5.2)
ALP SERPL-CCNC: 90 U/L (ref 35–104)
ALT SERPL W P-5'-P-CCNC: 14 U/L (ref 10–35)
ANION GAP SERPL CALCULATED.3IONS-SCNC: 12 MMOL/L (ref 7–15)
AST SERPL W P-5'-P-CCNC: 14 U/L (ref 10–35)
BILIRUB SERPL-MCNC: 0.6 MG/DL
BUN SERPL-MCNC: 13.7 MG/DL (ref 8–23)
CALCIUM SERPL-MCNC: 10.2 MG/DL (ref 8.8–10.2)
CHLORIDE SERPL-SCNC: 100 MMOL/L (ref 98–107)
CREAT SERPL-MCNC: 0.94 MG/DL (ref 0.51–0.95)
DEPRECATED HCO3 PLAS-SCNC: 24 MMOL/L (ref 22–29)
ERYTHROCYTE [SEDIMENTATION RATE] IN BLOOD BY WESTERGREN METHOD: 12 MM/HR (ref 0–30)
GFR SERPL CREATININE-BSD FRML MDRD: 62 ML/MIN/1.73M2
GLUCOSE SERPL-MCNC: 95 MG/DL (ref 70–99)
POTASSIUM SERPL-SCNC: 3.9 MMOL/L (ref 3.4–5.3)
PROT SERPL-MCNC: 6.8 G/DL (ref 6.4–8.3)
SODIUM SERPL-SCNC: 136 MMOL/L (ref 136–145)

## 2023-04-20 PROCEDURE — 85652 RBC SED RATE AUTOMATED: CPT | Mod: ORL | Performed by: FAMILY MEDICINE

## 2023-04-20 PROCEDURE — 80053 COMPREHEN METABOLIC PANEL: CPT | Mod: ORL | Performed by: FAMILY MEDICINE

## 2023-06-25 ENCOUNTER — HEALTH MAINTENANCE LETTER (OUTPATIENT)
Age: 78
End: 2023-06-25

## 2023-12-26 ENCOUNTER — LAB REQUISITION (OUTPATIENT)
Dept: LAB | Facility: CLINIC | Age: 78
End: 2023-12-26
Payer: COMMERCIAL

## 2023-12-26 DIAGNOSIS — I10 ESSENTIAL (PRIMARY) HYPERTENSION: ICD-10-CM

## 2023-12-26 DIAGNOSIS — E78.5 HYPERLIPIDEMIA, UNSPECIFIED: ICD-10-CM

## 2023-12-26 DIAGNOSIS — E04.2 NONTOXIC MULTINODULAR GOITER: ICD-10-CM

## 2023-12-26 DIAGNOSIS — G62.9 POLYNEUROPATHY, UNSPECIFIED: ICD-10-CM

## 2023-12-26 LAB
ALBUMIN SERPL BCG-MCNC: 4.1 G/DL (ref 3.5–5.2)
ALP SERPL-CCNC: 87 U/L (ref 40–150)
ALT SERPL W P-5'-P-CCNC: 9 U/L (ref 0–50)
ANION GAP SERPL CALCULATED.3IONS-SCNC: 15 MMOL/L (ref 7–15)
AST SERPL W P-5'-P-CCNC: 22 U/L (ref 0–45)
BILIRUB SERPL-MCNC: 0.7 MG/DL
BUN SERPL-MCNC: 16.5 MG/DL (ref 8–23)
CALCIUM SERPL-MCNC: 9.5 MG/DL (ref 8.8–10.2)
CHLORIDE SERPL-SCNC: 104 MMOL/L (ref 98–107)
CHOLEST SERPL-MCNC: 187 MG/DL
CREAT SERPL-MCNC: 0.86 MG/DL (ref 0.51–0.95)
CREAT UR-MCNC: 42.4 MG/DL
DEPRECATED HCO3 PLAS-SCNC: 20 MMOL/L (ref 22–29)
EGFRCR SERPLBLD CKD-EPI 2021: 69 ML/MIN/1.73M2
FASTING STATUS PATIENT QL REPORTED: ABNORMAL
GLUCOSE SERPL-MCNC: 102 MG/DL (ref 70–99)
HDLC SERPL-MCNC: 66 MG/DL
LDLC SERPL CALC-MCNC: 102 MG/DL
MICROALBUMIN UR-MCNC: <12 MG/L
MICROALBUMIN/CREAT UR: NORMAL MG/G{CREAT}
NONHDLC SERPL-MCNC: 121 MG/DL
POTASSIUM SERPL-SCNC: 4.2 MMOL/L (ref 3.4–5.3)
PROT SERPL-MCNC: 6.4 G/DL (ref 6.4–8.3)
SODIUM SERPL-SCNC: 139 MMOL/L (ref 135–145)
TRIGL SERPL-MCNC: 96 MG/DL
TSH SERPL DL<=0.005 MIU/L-ACNC: 0.85 UIU/ML (ref 0.3–4.2)
VIT B12 SERPL-MCNC: 345 PG/ML (ref 232–1245)

## 2023-12-26 PROCEDURE — 80053 COMPREHEN METABOLIC PANEL: CPT | Mod: ORL | Performed by: FAMILY MEDICINE

## 2023-12-26 PROCEDURE — 82043 UR ALBUMIN QUANTITATIVE: CPT | Mod: ORL | Performed by: FAMILY MEDICINE

## 2023-12-26 PROCEDURE — 82607 VITAMIN B-12: CPT | Mod: ORL | Performed by: FAMILY MEDICINE

## 2023-12-26 PROCEDURE — 84443 ASSAY THYROID STIM HORMONE: CPT | Mod: ORL | Performed by: FAMILY MEDICINE

## 2023-12-26 PROCEDURE — 80061 LIPID PANEL: CPT | Mod: ORL | Performed by: FAMILY MEDICINE

## 2024-06-27 ENCOUNTER — LAB REQUISITION (OUTPATIENT)
Dept: LAB | Facility: CLINIC | Age: 79
End: 2024-06-27

## 2024-06-27 DIAGNOSIS — I10 ESSENTIAL (PRIMARY) HYPERTENSION: ICD-10-CM

## 2024-06-27 PROCEDURE — 80048 BASIC METABOLIC PNL TOTAL CA: CPT | Performed by: FAMILY MEDICINE

## 2024-06-28 LAB
ANION GAP SERPL CALCULATED.3IONS-SCNC: 12 MMOL/L (ref 7–15)
BUN SERPL-MCNC: 10.9 MG/DL (ref 8–23)
CALCIUM SERPL-MCNC: 9.5 MG/DL (ref 8.8–10.2)
CHLORIDE SERPL-SCNC: 103 MMOL/L (ref 98–107)
CREAT SERPL-MCNC: 0.88 MG/DL (ref 0.51–0.95)
DEPRECATED HCO3 PLAS-SCNC: 24 MMOL/L (ref 22–29)
EGFRCR SERPLBLD CKD-EPI 2021: 67 ML/MIN/1.73M2
GLUCOSE SERPL-MCNC: 95 MG/DL (ref 70–99)
POTASSIUM SERPL-SCNC: 4 MMOL/L (ref 3.4–5.3)
SODIUM SERPL-SCNC: 139 MMOL/L (ref 135–145)

## 2024-07-29 ENCOUNTER — LAB REQUISITION (OUTPATIENT)
Dept: LAB | Facility: CLINIC | Age: 79
End: 2024-07-29
Payer: COMMERCIAL

## 2024-07-29 DIAGNOSIS — I10 ESSENTIAL (PRIMARY) HYPERTENSION: ICD-10-CM

## 2024-07-29 LAB
ANION GAP SERPL CALCULATED.3IONS-SCNC: 10 MMOL/L (ref 7–15)
BUN SERPL-MCNC: 16 MG/DL (ref 8–23)
CALCIUM SERPL-MCNC: 9.7 MG/DL (ref 8.8–10.4)
CHLORIDE SERPL-SCNC: 103 MMOL/L (ref 98–107)
CREAT SERPL-MCNC: 0.98 MG/DL (ref 0.51–0.95)
EGFRCR SERPLBLD CKD-EPI 2021: 59 ML/MIN/1.73M2
GLUCOSE SERPL-MCNC: 95 MG/DL (ref 70–99)
HCO3 SERPL-SCNC: 26 MMOL/L (ref 22–29)
POTASSIUM SERPL-SCNC: 4.1 MMOL/L (ref 3.4–5.3)
SODIUM SERPL-SCNC: 139 MMOL/L (ref 135–145)

## 2024-07-29 PROCEDURE — 80048 BASIC METABOLIC PNL TOTAL CA: CPT | Performed by: FAMILY MEDICINE

## 2024-07-31 ENCOUNTER — PATIENT OUTREACH (OUTPATIENT)
Dept: CARE COORDINATION | Facility: CLINIC | Age: 79
End: 2024-07-31
Payer: COMMERCIAL

## 2024-07-31 NOTE — PROGRESS NOTES
Clinic Care Coordination Contact  Care Team Conversations    Patient identified for care management outreach, however, patient is not part of a value-based contact therefore outreach cannot be completed by  CC. Will escalate to clinic staff if specific needs or resources are indicated.      ASAD Smith, [facility]  Social Work Care Coordinator  [facility]    [facility], [facility], and [facility]    [address] [place] [address]  [email or web address]  Cell: [phone]  Gender pronouns: she/her

## 2024-08-18 ENCOUNTER — HEALTH MAINTENANCE LETTER (OUTPATIENT)
Age: 79
End: 2024-08-18

## 2024-09-10 ENCOUNTER — HOSPITAL ENCOUNTER (OUTPATIENT)
Dept: CT IMAGING | Facility: CLINIC | Age: 79
Discharge: HOME | End: 2024-09-10
Attending: FAMILY MEDICINE | Admitting: FAMILY MEDICINE
Payer: COMMERCIAL

## 2024-09-10 DIAGNOSIS — J06.9 ACUTE URI: ICD-10-CM

## 2024-09-10 PROCEDURE — 70486 CT MAXILLOFACIAL W/O DYE: CPT

## 2024-11-07 ENCOUNTER — TRANSCRIBE ORDERS (OUTPATIENT)
Dept: OTHER | Age: 79
End: 2024-11-07

## 2024-11-07 DIAGNOSIS — I67.1 CEREBRAL ANEURYSM, NONRUPTURED: Primary | ICD-10-CM

## 2024-11-19 ENCOUNTER — HOSPITAL ENCOUNTER (OUTPATIENT)
Dept: MAMMOGRAPHY | Facility: CLINIC | Age: 79
Discharge: HOME | End: 2024-11-19
Attending: FAMILY MEDICINE
Payer: COMMERCIAL

## 2024-11-19 DIAGNOSIS — Z12.39 SCREENING FOR BREAST CANCER: ICD-10-CM

## 2024-11-19 DIAGNOSIS — Z12.31 VISIT FOR SCREENING MAMMOGRAM: ICD-10-CM

## 2024-11-19 PROCEDURE — 77063 BREAST TOMOSYNTHESIS BI: CPT

## 2024-11-29 ENCOUNTER — HOSPITAL ENCOUNTER (OUTPATIENT)
Dept: CT IMAGING | Facility: CLINIC | Age: 79
Discharge: HOME | End: 2024-11-29
Attending: FAMILY MEDICINE | Admitting: FAMILY MEDICINE
Payer: COMMERCIAL

## 2024-11-29 DIAGNOSIS — R91.1 NODULE OF LOWER LOBE OF RIGHT LUNG: ICD-10-CM

## 2024-11-29 PROCEDURE — 71250 CT THORAX DX C-: CPT

## 2025-03-19 ENCOUNTER — LAB REQUISITION (OUTPATIENT)
Dept: LAB | Facility: CLINIC | Age: 80
End: 2025-03-19

## 2025-03-19 DIAGNOSIS — I10 ESSENTIAL (PRIMARY) HYPERTENSION: ICD-10-CM

## 2025-03-19 DIAGNOSIS — E78.5 HYPERLIPIDEMIA, UNSPECIFIED: ICD-10-CM

## 2025-03-19 PROCEDURE — 80048 BASIC METABOLIC PNL TOTAL CA: CPT | Performed by: FAMILY MEDICINE

## 2025-03-19 PROCEDURE — 80061 LIPID PANEL: CPT | Performed by: FAMILY MEDICINE

## 2025-03-20 LAB
ANION GAP SERPL CALCULATED.3IONS-SCNC: 12 MMOL/L (ref 7–15)
BUN SERPL-MCNC: 19.7 MG/DL (ref 8–23)
CALCIUM SERPL-MCNC: 10.1 MG/DL (ref 8.8–10.4)
CHLORIDE SERPL-SCNC: 102 MMOL/L (ref 98–107)
CHOLEST SERPL-MCNC: 218 MG/DL
CREAT SERPL-MCNC: 1.04 MG/DL (ref 0.51–0.95)
EGFRCR SERPLBLD CKD-EPI 2021: 54 ML/MIN/1.73M2
FASTING STATUS PATIENT QL REPORTED: ABNORMAL
FASTING STATUS PATIENT QL REPORTED: ABNORMAL
GLUCOSE SERPL-MCNC: 102 MG/DL (ref 70–99)
HCO3 SERPL-SCNC: 25 MMOL/L (ref 22–29)
HDLC SERPL-MCNC: 76 MG/DL
LDLC SERPL CALC-MCNC: 125 MG/DL
NONHDLC SERPL-MCNC: 142 MG/DL
POTASSIUM SERPL-SCNC: 4.7 MMOL/L (ref 3.4–5.3)
SODIUM SERPL-SCNC: 139 MMOL/L (ref 135–145)
TRIGL SERPL-MCNC: 83 MG/DL

## 2025-03-23 ENCOUNTER — HEALTH MAINTENANCE LETTER (OUTPATIENT)
Age: 80
End: 2025-03-23

## 2025-03-28 PROBLEM — I10 BENIGN ESSENTIAL HYPERTENSION: Status: ACTIVE | Noted: 2025-03-28

## 2025-03-28 PROBLEM — E78.2 MIXED HYPERLIPIDEMIA: Status: ACTIVE | Noted: 2025-03-28

## 2025-03-28 PROBLEM — Z86.69 HISTORY OF BELL'S PALSY: Status: ACTIVE | Noted: 2025-03-28

## 2025-03-28 PROBLEM — I67.1 CEREBRAL ANEURYSM, NONRUPTURED: Status: ACTIVE | Noted: 2025-03-28

## 2025-03-28 PROBLEM — R91.1 NODULE OF LOWER LOBE OF RIGHT LUNG: Status: ACTIVE | Noted: 2025-03-28

## 2025-03-28 NOTE — PROGRESS NOTES
NEUROLOGY CONSULTATION NOTE       [facility]UROLOG[facility] [place]  [address] [place] [address]  Tel: [phone]  Fax: [phone]  [email or web address]     Nery Fontanez,  1945, MRN [id number]  PCP: Tony Jimenez  Date: 2025     ASSESSMENT & PLAN     Visit Diagnosis  Cerebral aneurysm, nonruptured     H/O right paraophthalmic aneurysm  Intractable headache  79-year-old female with history of HTN, HLD, right Bell's palsy, glaucoma, cigarette smoking who was initially seen in our clinic in  and lastly in  for an incidental right paraophthalmic aneurysm.  This was being monitored and most recent MRI in  showed a stable 2 x 2 by 3 x 2 x 2.8 laterally projecting aneurysm arising from the lateral margin of the anterior genu of the cavernous segment of the internal carotid artery.  Patient has developed headaches and reports pressure-like sensation behind her eyes.  This likely is due to her glaucoma but I have recommended:    1.  MRI and MRA head and neck  2.  Temporal arteries ultrasound  3.  Check sed rate, C-reactive protein, antinuclear antibody and rheumatoid factor  4.  If above workup is normal I will consider starting her on low-dose of Effexor to help her with her headaches although I suspect it is mostly due to her glaucoma.  5.  Follow-up in 6 weeks    Thank you again for this referral, please feel free to contact me if you have any questions.    Reinaldo Sun MD  [facility]     REASON FOR CONSULTATION Consult        HISTORY OF PRESENT ILLNESS     We have been requested by Dr. Liriano to evaluate Nery Fontanez who is a 79 year old  female for history of cerebral aneurysm    Patient is a 79-year-old female with history of hypertension, hyperlipidemia, right Bell's palsy, glaucoma, cigarette smoking who was last seen in our clinic in  and prior to that in  for right Bell's palsy and had MRI of the head that showed  an incidental right paraophthalmic aneurysm and subsequently had cerebral angiogram that confirmed the aneurysm.  Since then she had serial MRI scan most recent one on 11/9/2023 and it showed a stable 2 x 2 by 3 x 2 x 2.8 mm laterally projecting aneurysm arising from the lateral margin of the anterior general of the cavernous segment of the right internal carotid and is stable compared to 2016.  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  She had some lab work done that included a normal sed rate and electrolytes.  She also has some visual symptoms but has been dealing with that since her cataract surgery more than 2 years ago and also has open-angle glaucoma.  She denies any jaw claudication, arthralgias or any loss of vision.  According to patient she gets a pressure-like sensation behind her eyes.  She has not noticed any arthralgia but does have jaw claudication.  The symptoms started in October 2024 and have persisted.  She takes Tylenol at least 3-4 times a week.     PROBLEM LIST   Patient Active Problem List   Diagnosis    Thyroid nodule    Nodule of lower lobe of right lung    Cerebral aneurysm, nonruptured    Benign essential hypertension    Mixed hyperlipidemia    History of Bell's palsy    CKD stage 3a, GFR 45-59 ml/min (H)    Open-angle glaucoma of both eyes, indeterminate stage         PAST MEDICAL & SURGICAL HISTORY     Past Medical History:   Patient  has no past medical history on file.    Surgical History:  She  has a past surgical history that includes Hysterectomy (1991) and Oophorectomy (Bilateral, 1991).     SOCIAL HISTORY     Reviewed, and she  reports that she has quit smoking. Her smoking use included cigarettes. She has never used smokeless tobacco.     FAMILY HISTORY     Reviewed, and family history is not on file.     ALLERGIES     Allergies   Allergen Reactions    Lisinopril Hives and  Swelling    Other Drug Allergy (See Comments) Hives     MULTIVIT-IRON-MIN-FOLIC ACID, CENTRUM         REVIEW OF SYSTEMS     A 12 point review of system was performed and was negative except as outlined in the history of present illness.     HOME MEDICATIONS     Current Outpatient Rx   Medication Sig Dispense Refill    ascorbic acid, vitamin C, (ASCORBIC ACID) 250 mg Chew [ASCORBIC ACID, VITAMIN C, (ASCORBIC ACID) 250 MG CHEW] Chew.      aspirin 81 MG EC tablet [ASPIRIN 81 MG EC TABLET] Take 81 mg by mouth daily.      diltiazem (CARDIZEM SR) 120 MG CP12 12 hr SR capsule Take 120 mg by mouth 2 times daily.      losartan (COZAAR) 100 MG tablet [LOSARTAN (COZAAR) 100 MG TABLET] Take 100 mg by mouth daily.      triamterene-hydrochlorothiazide (DYAZIDE) 37.5-25 mg per capsule [TRIAMTERENE-HYDROCHLOROTHIAZIDE (DYAZIDE) 37.5-25 MG PER CAPSULE] Take 1 capsule by mouth every morning.      VEOZAH 45 MG TABS Take 45 mg by mouth once as needed.           PHYSICAL EXAM     Vital signs  /61 (BP Location: Left arm, Patient Position: Sitting)   Pulse 68   Wt 86.2 kg (190 lb)     Weight:   190 lbs 0 oz    Elderly female who is alert and oriented x 3 no acute distress.  Vital signs are reviewed and documented in electronic medical record.  Neck supple.  Neurologically speech was normal.  Cranial nerves were intact.  She has difficulty with reading.  No end gaze nystagmus.  She has normal mass and tone with 5/5 strength.  Sensation intact to light touch pinprick.  No dysmetria noted on finger-nose testing.  Gait normal Romberg negative     PERTINENT DIAGNOSTIC STUDIES     Following studies were reviewed:     MRA BRAIN 11/9/2023  Redemonstrated laterally projecting right paraophthalmic artery aneurysm measuring 0.3 x 0.4 cm, not significantly changed since 01/06/2020.     MRI BRAIN:1/6/20  1. No finding for intracranial hemorrhage, mass, or acute infarct. Small chronic lacunar infarct is seen within the right caudate.    MRA  [place] [facility] 1/6/2020  1. Stable roughly 2.2 x 3.2 x 2.8 mm laterally projecting aneurysm arising from the lateral margin of the anterior genu of the cavernous segment of the internal carotid artery on the right, and stable in appearance dating back to 10/28/2016.  2. No other finding for aneurysm, vascular cutoff, or flow-limiting stenosis.    MRI & MRA BRAIN 3/28/2018  HEAD MRI:  1.  No evidence for infarct, hemorrhage, obstructive hydrocephalus or intracranial mass.   2.  Minimal stable generalized cerebral and cerebellar atrophy and minimal chronic small vessel ischemic changes in the cerebral white matter.   3.  Trace mucosal thickening in a few ethmoid air cells.     HEAD MRA:  1.  Head MRA is unchanged from MRA 06/23/2017 with all of the major large-caliber proximal intracranial vessels patent.  2.  Again seen is the small aneurysm directed laterally from the right anterior carotid siphon at the level of the ophthalmic artery. Its appearance is stable. Today it measures approximately 5 mm in length by 3 mm AP. These measurements are larger than   on the 2017 dictation however this is due to measurement technique. Today's measurements are more similar to previous studies in 2016 and 2014. Overall it has not changed. No evidence for new aneurysm elsewhere    MRI BRAIN, MRA BRAIN 9/15/2024  HEAD MRI:  1.  Hazy non-masslike enhancement at the fundus of the right internal auditory canal and involving the labyrinthine and geniculate segments of the right facial nerve. This suggests a facial neuritis and would be consistent with the diagnosis of Bell's  palsy.  2.  Otherwise, no cerebellopontine angle or internal auditory canal mass. No additional pathologic temporal bone enhancement.  3.  No evidence for acute intracranial process. No mass, hemorrhage or recent infarct.  4.  A few nonspecific foci of T2 and FLAIR hyperintensity within the periventricular and deep cerebral white matter, likely sequela of chronic  microvascular ischemic change.    HEAD MRA:  1.  5 x 3 x 5.5 mm laterally projecting aneurysm arising from the proximal supraclinoid right internal carotid artery, potentially paraophthalmic in nature. Consider referral to neuro interventional radiology for consideration of elective treatment.  2.  No evidence for proximal vessel occlusion, high-grade intracranial stenosis or definite additional aneurysms.    NECK MRA:  1.  Mild atherosclerotic change at the carotid bifurcations without evidence for hemodynamically significant carotid arterial stenosis by NASCET criteria. Retropharyngeal course of the proximal internal carotid artery segments.  2.  Dominant left vertebral artery. No evidence for significant cervical vertebral artery stenosis.    CEREBRAL ANGIOGRAM 10/3/2014       PERTINENT LABS  Following labs were reviewed:  Lab Requisition on 03/19/2025   Component Date Value Ref Range Status    Sodium 03/19/2025 139  135 - 145 mmol/L Final    Potassium 03/19/2025 4.7  3.4 - 5.3 mmol/L Final    Chloride 03/19/2025 102  98 - 107 mmol/L Final    Carbon Dioxide (CO2) 03/19/2025 25  22 - 29 mmol/L Final    Anion Gap 03/19/2025 12  7 - 15 mmol/L Final    Urea Nitrogen 03/19/2025 19.7  8.0 - 23.0 mg/dL Final    Creatinine 03/19/2025 1.04 (H)  0.51 - 0.95 mg/dL Final    GFR Estimate 03/19/2025 54 (L)  >60 mL/min/1.73m2 Final    Calcium 03/19/2025 10.1  8.8 - 10.4 mg/dL Final    Glucose 03/19/2025 102 (H)  70 - 99 mg/dL Final    Patient Fasting > 8hrs? 03/19/2025 Unknown   Final    Cholesterol 03/19/2025 218 (H)  <200 mg/dL Final    Triglycerides 03/19/2025 83  <150 mg/dL Final    Direct Measure HDL 03/19/2025 76  >=50 mg/dL Final    LDL Cholesterol Calculated 03/19/2025 125 (H)  <100 mg/dL Final    Non HDL Cholesterol 03/19/2025 142 (H)  <130 mg/dL Final    Patient Fasting > 8hrs? 03/19/2025 Unknown   Final        Total time spent for face to face visit, reviewing labs/imaging studies, counseling and coordination of care  was: 1 Hour spent on the date of the encounter doing chart review, review of outside records, review of test results, interpretation of tests, patient visit, and documentation     The longitudinal plan of care for the diagnosis(es)/condition(s) as documented were addressed during this visit. Due to the added complexity in care, I will continue to support Nery in the subsequent management and with ongoing continuity of care.    This note was dictated using voice recognition software.  Any grammatical or context distortions are unintentional and inherent to the software.    Orders Placed This Encounter   Procedures    MR Brain w/o & w Contrast    MRA Brain ([place] [facility] [place]) w/o & w Contrast    MRA Neck ([place]) w/o & w Contrast    US Temporal Arteries Duplex    CRP inflammation    Erythrocyte sedimentation rate auto    Anti Nuclear [place] IgG by IFA with Reflex    Rheumatoid factor      New Prescriptions    No medications on file      Modified Medications    No medications on file

## 2025-04-02 ENCOUNTER — OFFICE VISIT (OUTPATIENT)
Dept: NEUROLOGY | Facility: CLINIC | Age: 80
End: 2025-04-02
Attending: FAMILY MEDICINE
Payer: COMMERCIAL

## 2025-04-02 ENCOUNTER — LAB (OUTPATIENT)
Dept: LAB | Facility: CLINIC | Age: 80
End: 2025-04-02
Payer: COMMERCIAL

## 2025-04-02 ENCOUNTER — TELEPHONE (OUTPATIENT)
Dept: NEUROLOGY | Facility: CLINIC | Age: 80
End: 2025-04-02

## 2025-04-02 VITALS — SYSTOLIC BLOOD PRESSURE: 131 MMHG | DIASTOLIC BLOOD PRESSURE: 61 MMHG | WEIGHT: 190 LBS | HEART RATE: 68 BPM

## 2025-04-02 DIAGNOSIS — I67.1 CEREBRAL ANEURYSM, NONRUPTURED: Primary | ICD-10-CM

## 2025-04-02 DIAGNOSIS — M31.6 TEMPORAL ARTERITIS SYNDROME (H): ICD-10-CM

## 2025-04-02 PROBLEM — N18.31 CKD STAGE 3A, GFR 45-59 ML/MIN (H): Status: ACTIVE | Noted: 2025-04-02

## 2025-04-02 PROBLEM — H40.10X4 OPEN-ANGLE GLAUCOMA OF BOTH EYES, INDETERMINATE STAGE: Status: ACTIVE | Noted: 2025-04-02

## 2025-04-02 LAB
CRP SERPL-MCNC: 3.18 MG/L
ERYTHROCYTE [SEDIMENTATION RATE] IN BLOOD BY WESTERGREN METHOD: 19 MM/HR (ref 0–30)
RHEUMATOID FACT SERPL-ACNC: <10 IU/ML

## 2025-04-02 PROCEDURE — 85652 RBC SED RATE AUTOMATED: CPT

## 2025-04-02 PROCEDURE — 3075F SYST BP GE 130 - 139MM HG: CPT | Performed by: PSYCHIATRY & NEUROLOGY

## 2025-04-02 PROCEDURE — 86140 C-REACTIVE PROTEIN: CPT

## 2025-04-02 PROCEDURE — 36415 COLL VENOUS BLD VENIPUNCTURE: CPT

## 2025-04-02 PROCEDURE — 86038 ANTINUCLEAR ANTIBODIES: CPT

## 2025-04-02 PROCEDURE — 3078F DIAST BP <80 MM HG: CPT | Performed by: PSYCHIATRY & NEUROLOGY

## 2025-04-02 PROCEDURE — G2211 COMPLEX E/M VISIT ADD ON: HCPCS | Performed by: PSYCHIATRY & NEUROLOGY

## 2025-04-02 PROCEDURE — 99205 OFFICE O/P NEW HI 60 MIN: CPT | Performed by: PSYCHIATRY & NEUROLOGY

## 2025-04-02 PROCEDURE — 86431 RHEUMATOID FACTOR QUANT: CPT

## 2025-04-02 RX ORDER — DILTIAZEM HYDROCHLORIDE 120 MG/1
120 CAPSULE, EXTENDED RELEASE ORAL 2 TIMES DAILY
COMMUNITY

## 2025-04-02 RX ORDER — FEZOLINETANT 45 MG/1
45 TABLET, FILM COATED ORAL
COMMUNITY
Start: 2024-06-27

## 2025-04-02 NOTE — TELEPHONE ENCOUNTER
Orders for brain MRI, neck and brain MRA's and ultrasound temporal arteries faxed to [facility] at [phone].    Bisi Mendez LPN on 4/2/2025 at 3:54 PM

## 2025-04-02 NOTE — TELEPHONE ENCOUNTER
[facility] Call Center    Phone Message    May a detailed message be left on voicemail: yes     Reason for Call: Order(s): Other:   Reason for requested: US Temporal Arteries Duplex  Date needed: 04/02/2025  Provider name: Reinaldo Sun MD     Patient states that when she called to schedule the ultra sound ordered by Dr Sun she was told that it can only be done in [place] or [place] through [facility].   Patient states this is too far for her to travel and she would like the order sent to [facility] in [place] for scheduling.   Patient states she will call to confirm if the ultra sound can be done there.     Patient requesting call back from clinic to confirm order will be sent to [facility] and to possibly discuss other options if [facility] in [place] cannot do the ultra sound there.     Patient's call back #[phone]     Action Taken: Message routed to:  Other: [facility]    Travel Screening: Not Applicable

## 2025-04-02 NOTE — TELEPHONE ENCOUNTER
Sent pt [employer] message encouraging her to proceed with ordered US through [employer] system, encouraged using family/friends to assist with driving as needed.     Willing to fax order if she finds closer location who can do that specific US.     Tato COLEMAN RN, BSN  [facility]rology

## 2025-04-02 NOTE — TELEPHONE ENCOUNTER
Reviewed second message, called patient, she reports that [facility] confirmed that she can have US completed there. She would like to do MRI, MRA, and US all at [facility] in [place]. Asking team to fax orders.     RN informed that we would fax orders to [facility] and asked her to notify of date/time scheduled. She is agreeable. Did inform her that I sent a mychart message that she can disregard, she verbalizes understanding.     Tato COLEMAN RN, BSN  [facility]

## 2025-04-02 NOTE — NURSING NOTE
Chief Complaint   Patient presents with    Consult     Cerebral aneurysm, nonruptured,  Revisit for aneurysm and headaches,  Referred by Lexus Liriano MD Kena Gemeda, MA on 4/2/2025 at 9:49 AM

## 2025-04-02 NOTE — TELEPHONE ENCOUNTER
Health Call Center    Phone Message    May a detailed message be left on voicemail: yes     Reason for Call: Other:   Patient calling in. Requesting for ultrasound, MRI and MRA orders can be sent over to [facility]. Please review as needed. Patient's can be reached at [phone]     Action Taken: Message routed to:  Other: [facility]

## 2025-04-03 LAB
ANA PAT SER IF-IMP: ABNORMAL
ANA SER QL IF: ABNORMAL
ANA TITR SER IF: ABNORMAL {TITER}

## 2025-04-21 ENCOUNTER — TELEPHONE (OUTPATIENT)
Dept: NEUROLOGY | Facility: CLINIC | Age: 80
End: 2025-04-21
Payer: COMMERCIAL

## 2025-04-21 NOTE — TELEPHONE ENCOUNTER
[facility] Call Center    Phone Message    May a detailed message be left on voicemail: no     Reason for Call: Other: Magali Larsen calling to inform Dr. Sun and team that they are unable to perform the ultrasound, however are able to perform the MRI's.     Action Taken: Message routed to:  [facility]    Travel Screening: Not Applicable     Date of Service:

## 2025-04-22 NOTE — TELEPHONE ENCOUNTER
RN returned call to [facility]- they are unable to complete the temporal artery US at any of their locations.     Spoke with Nery- patient has all of her imaging (MRI, MRA, US) scheduled through [place] on 5/8. Will follow up with Dr. Sun on 5/14.     Nazia VALLES RN, BSN  [facility]

## 2025-05-08 ENCOUNTER — ANCILLARY PROCEDURE (OUTPATIENT)
Dept: ULTRASOUND IMAGING | Facility: CLINIC | Age: 80
End: 2025-05-08
Attending: PSYCHIATRY & NEUROLOGY
Payer: COMMERCIAL

## 2025-05-08 ENCOUNTER — RESULTS FOLLOW-UP (OUTPATIENT)
Dept: NEUROLOGY | Facility: CLINIC | Age: 80
End: 2025-05-08

## 2025-05-08 ENCOUNTER — ANCILLARY PROCEDURE (OUTPATIENT)
Dept: MRI IMAGING | Facility: CLINIC | Age: 80
End: 2025-05-08
Attending: PSYCHIATRY & NEUROLOGY
Payer: COMMERCIAL

## 2025-05-08 DIAGNOSIS — I67.1 CEREBRAL ANEURYSM, NONRUPTURED: ICD-10-CM

## 2025-05-08 DIAGNOSIS — M31.6 TEMPORAL ARTERITIS SYNDROME (H): ICD-10-CM

## 2025-05-08 PROCEDURE — A9585 GADOBUTROL INJECTION: HCPCS | Mod: JW | Performed by: RADIOLOGY

## 2025-05-08 PROCEDURE — 99207 MRA BRAIN (CIRCLE OF WILLIS) W/O CONTRAST: CPT | Performed by: RADIOLOGY

## 2025-05-08 PROCEDURE — 70553 MRI BRAIN STEM W/O & W/DYE: CPT | Performed by: RADIOLOGY

## 2025-05-08 PROCEDURE — 93882 EXTRACRANIAL UNI/LTD STUDY: CPT | Performed by: STUDENT IN AN ORGANIZED HEALTH CARE EDUCATION/TRAINING PROGRAM

## 2025-05-08 PROCEDURE — 70549 MR ANGIOGRAPH NECK W/O&W/DYE: CPT | Performed by: RADIOLOGY

## 2025-05-08 RX ORDER — GADOBUTROL 604.72 MG/ML
10 INJECTION INTRAVENOUS ONCE
Status: COMPLETED | OUTPATIENT
Start: 2025-05-08 | End: 2025-05-08

## 2025-05-08 RX ADMIN — GADOBUTROL 8.5 ML: 604.72 INJECTION INTRAVENOUS at 15:31

## 2025-05-08 NOTE — DISCHARGE INSTRUCTIONS
MRI Contrast Discharge Instructions    The IV contrast you received today will pass out of your body in your  urine. This will happen in the next 24 hours. You will not feel this process.  Your urine will not change color.    Drink at least 4 extra glasses of water or juice today (unless your doctor  has restricted your fluids). This reduces the stress on your kidneys.  You may take your regular medicines.    If you are on dialysis: It is best to have dialysis today.    If you have a reaction: Most reactions happen right away. If you have  any new symptoms after leaving the hospital (such as hives or swelling),  call your hospital at the correct number below. Or call your family doctor.  If you have breathing distress or wheezing, call 911.    Special instructions: ***    I have read and understand the above information.    Signature:______________________________________ Date:___________    Staff:__________________________________________ Date:___________     Time:__________    [facility] Departments:    ___[place]: [phone]  ___[facility]: [phone]  ___[place]: [phone] ___[facility]: [phone]  ___[facility]: [phone]  ___[facility]: [phone]  ___[phone]  ___[facility]: [phone]  ___[phone]

## 2025-05-12 NOTE — PROGRESS NOTES
NEUROLOGY FOLLOW UP VISIT  NOTE       [facility] NEUROLOGY [place]  [address] [place] [address]  Tel: [phone]  Fax: [phone]  [email or web address]     Nery Fontanez,  1945, MRN [id number]  PCP: Tony Jimenez  Date: 2025      ASSESSMENT & PLAN     Visit Diagnosis  Cerebral aneurysm, nonruptured  Chronic tension-type headache, not intractable     Tension type headache  H/O right paraophthalmic aneurysm  79-year-old female with history of HTN, HLD, right Bell's palsy, glaucoma, cigarette smoking who was initially seen in our clinic in  for incidental right paraophthalmic aneurysm.  This is being monitored and most recent MRI in  shows a stable3.4 x 3.1 x 2.4 mm, aneurysm similar to .  She had developed pressure-like sensation behind the eye and had lab work, temporal artery ultrasound that was normal.  I have recommended:    1.  Start Effexor 37.5 mg daily  2.  I have encouraged her to follow-up with ophthalmology for her glaucoma that I suspect is a major cause for her headaches 3.  Follow-up in 3 months    Thank you again for this referral, please feel free to contact me if you have any questions.    Reinaldo Sun MD  [facility]     HISTORY OF PRESENT ILLNESS     Patient is a 79-year-old female with history of HTN, HLD, right Bell's palsy, glaucoma, cigarette smoking who was initially seen in our clinic in  and then in  for incidental right paraophthalmic aneurysm.This was being monitored and most recent MRI in  showed a stable 3.4 x 3.1 x 2.4 mm, laterally projecting aneurysm arising from the lateral margin of the anterior genu of the cavernous segment of the internal carotid artery.      She had developed headache and reported pressure-like sensation behind the eye and sed rate, C-reactive protein, antinuclear antibody and rheumatoid factor were checked & were essentially normal.  Antinuclear antibody was  borderline elevated at 1: 40 of no clinical significance.  Ultrasound of the temporal arteries did not show any evidence of giant cell arteritis.  MRI brain, MRA head and neck showed no significant change in the size of the right paraophthalmic aneurysm compared to 2021.  She continues to have headaches but admits if she closes her eyes her headaches do tend to improve.  For abortive treatment she is using Tylenol     PROBLEM LIST   Patient Active Problem List   Diagnosis    Thyroid nodule    Nodule of lower lobe of right lung    Cerebral aneurysm, nonruptured    Benign essential hypertension    Mixed hyperlipidemia    History of Bell's palsy    CKD stage 3a, GFR 45-59 ml/min (H)    Open-angle glaucoma of both eyes, indeterminate stage         PAST MEDICAL & SURGICAL HISTORY     Past Medical History:   Patient  has no past medical history on file.    Surgical History:  She  has a past surgical history that includes Hysterectomy (1991) and Oophorectomy (Bilateral, 1991).     SOCIAL HISTORY     Reviewed, and she  reports that she has quit smoking. Her smoking use included cigarettes. She has never used smokeless tobacco.     FAMILY HISTORY     Reviewed, and family history is not on file.     ALLERGIES     Allergies   Allergen Reactions    Lisinopril Hives and Swelling    Other Drug Allergy (See Comments) Hives     MULTIVIT-IRON-MIN-FOLIC ACID, CENTRUM         REVIEW OF SYSTEMS     A 12 point review of system was performed and was negative except as outlined in the history of present illness.     HOME MEDICATIONS     Current Outpatient Rx   Medication Sig Dispense Refill    ascorbic acid, vitamin C, (ASCORBIC ACID) 250 mg Chew [ASCORBIC ACID, VITAMIN C, (ASCORBIC ACID) 250 MG CHEW] Chew.      aspirin 81 MG EC tablet [ASPIRIN 81 MG EC TABLET] Take 81 mg by mouth daily.      diltiazem (CARDIZEM SR) 120 MG CP12 12 hr SR capsule Take 120 mg by mouth 2 times daily.      losartan (COZAAR) 100 MG tablet [LOSARTAN (COZAAR) 100  MG TABLET] Take 100 mg by mouth daily.      triamterene-hydrochlorothiazide (DYAZIDE) 37.5-25 mg per capsule [TRIAMTERENE-HYDROCHLOROTHIAZIDE (DYAZIDE) 37.5-25 MG PER CAPSULE] Take 1 capsule by mouth every morning.      VEOZAH 45 MG TABS Take 45 mg by mouth once as needed.           PHYSICAL EXAM     Vital signs  /69 (BP Location: Right arm, Patient Position: Sitting, Cuff Size: Adult Regular)   Pulse 60   Wt 84.1 kg (185 lb 4.8 oz)     Weight:   185 lbs 4.8 oz    Elderly female who is alert and oriented x 3 no acute distress. Vital signs are reviewed and documented in electronic medical record. Neck supple. Neurologically speech was normal. Cranial nerves were intact. She has difficulty with reading. No end gaze nystagmus. She has normal mass and tone with 5/5 strength. Sensation intact to light touch pinprick. No dysmetria noted on finger-nose testing. Gait normal Romberg negative     PERTINENT DIAGNOSTIC STUDIES     Following studies were reviewed:     MRI, MRA BRAIN 5/8/2025  1. No acute intracranial pathology.  2. A few T2 hyperintense white matter foci, most likely represents  chronic small vessel ischemic disease.  1. No significant change in right parophthalmic aneurysm projecting  laterally, measuring 3.4 x 3.1 x 2.4 mm, similar to 2021.  2. No significant stenosis noted of the major cervical arteries.     MRA BRAIN 11/9/2023  Redemonstrated laterally projecting right paraophthalmic artery aneurysm measuring 0.3 x 0.4 cm, not significantly changed since 01/06/2020.      MRI BRAIN:1/6/20  1. No finding for intracranial hemorrhage, mass, or acute infarct. Small chronic lacunar infarct is seen within the right caudate.    MRA [place] OF [facility] 1/6/2020  1. Stable roughly 2.2 x 3.2 x 2.8 mm laterally projecting aneurysm arising from the lateral margin of the anterior genu of the cavernous segment of the internal carotid artery on the right, and stable in appearance dating back to 10/28/2016.  2. No other  finding for aneurysm, vascular cutoff, or flow-limiting stenosis.     MRI & MRA BRAIN 3/28/2018  HEAD MRI:  1.  No evidence for infarct, hemorrhage, obstructive hydrocephalus or intracranial mass.   2.  Minimal stable generalized cerebral and cerebellar atrophy and minimal chronic small vessel ischemic changes in the cerebral white matter.   3.  Trace mucosal thickening in a few ethmoid air cells.     HEAD MRA:  1.  Head MRA is unchanged from MRA 06/23/2017 with all of the major large-caliber proximal intracranial vessels patent.  2.  Again seen is the small aneurysm directed laterally from the right anterior carotid siphon at the level of the ophthalmic artery. Its appearance is stable. Today it measures approximately 5 mm in length by 3 mm AP. These measurements are larger than   on the 2017 dictation however this is due to measurement technique. Today's measurements are more similar to previous studies in 2016 and 2014. Overall it has not changed. No evidence for new aneurysm elsewhere     MRI BRAIN, MRA BRAIN 9/15/2024  HEAD MRI:  1.  Hazy non-masslike enhancement at the fundus of the right internal auditory canal and involving the labyrinthine and geniculate segments of the right facial nerve. This suggests a facial neuritis and would be consistent with the diagnosis of Bell's  palsy.  2.  Otherwise, no cerebellopontine angle or internal auditory canal mass. No additional pathologic temporal bone enhancement.  3.  No evidence for acute intracranial process. No mass, hemorrhage or recent infarct.  4.  A few nonspecific foci of T2 and FLAIR hyperintensity within the periventricular and deep cerebral white matter, likely sequela of chronic microvascular ischemic change.     HEAD MRA:  1.  5 x 3 x 5.5 mm laterally projecting aneurysm arising from the proximal supraclinoid right internal carotid artery, potentially paraophthalmic in nature. Consider referral to neuro interventional radiology for consideration of  elective treatment.  2.  No evidence for proximal vessel occlusion, high-grade intracranial stenosis or definite additional aneurysms.     NECK MRA:  1.  Mild atherosclerotic change at the carotid bifurcations without evidence for hemodynamically significant carotid arterial stenosis by NASCET criteria. Retropharyngeal course of the proximal internal carotid artery segments.  2.  Dominant left vertebral artery. No evidence for significant cervical vertebral artery stenosis.     ULTRASOUND TEMPORAL ARTERIES 5/8/2025  No evidence of giant cell arteritis    CEREBRAL ANGIOGRAM 10/3/2014       PERTINENT LABS  Following labs were reviewed:  Lab on 04/02/2025   Component Date Value Ref Range Status    Rheumatoid Factor 04/02/2025 <10  <14 IU/mL Final    EMELY interpretation 04/02/2025 Borderline Positive (A)  Negative Final    EMELY pattern 1 04/02/2025 Speckled   Final    EMELY titer 1 04/02/2025 1:40   Final    Erythrocyte Sedimentation Rate 04/02/2025 19  0 - 30 mm/hr Final    CRP Inflammation 04/02/2025 3.18  <5.00 mg/L Final   Lab Requisition on 03/19/2025   Component Date Value Ref Range Status    Sodium 03/19/2025 139  135 - 145 mmol/L Final    Potassium 03/19/2025 4.7  3.4 - 5.3 mmol/L Final    Chloride 03/19/2025 102  98 - 107 mmol/L Final    Carbon Dioxide (CO2) 03/19/2025 25  22 - 29 mmol/L Final    Anion Gap 03/19/2025 12  7 - 15 mmol/L Final    Urea Nitrogen 03/19/2025 19.7  8.0 - 23.0 mg/dL Final    Creatinine 03/19/2025 1.04 (H)  0.51 - 0.95 mg/dL Final    GFR Estimate 03/19/2025 54 (L)  >60 mL/min/1.73m2 Final    Calcium 03/19/2025 10.1  8.8 - 10.4 mg/dL Final    Glucose 03/19/2025 102 (H)  70 - 99 mg/dL Final    Patient Fasting > 8hrs? 03/19/2025 Unknown   Final    Cholesterol 03/19/2025 218 (H)  <200 mg/dL Final    Triglycerides 03/19/2025 83  <150 mg/dL Final    Direct Measure HDL 03/19/2025 76  >=50 mg/dL Final    LDL Cholesterol Calculated 03/19/2025 125 (H)  <100 mg/dL Final    Non HDL Cholesterol  03/19/2025 142 (H)  <130 mg/dL Final    Patient Fasting > 8hrs? 03/19/2025 Unknown   Final         Total time spent for face to face visit, reviewing labs/imaging studies, counseling and coordination of care was: 30 Minutes spent on the date of the encounter doing chart review, review of outside records, review of test results, interpretation of tests, patient visit, and documentation     The longitudinal plan of care for the diagnosis(es)/condition(s) as documented were addressed during this visit. Due to the added complexity in care, I will continue to support Nery in the subsequent management and with ongoing continuity of care.    This note was dictated using voice recognition software.  Any grammatical or context distortions are unintentional and inherent to the software.    No orders of the defined types were placed in this encounter.     New Prescriptions    No medications on file     Modified Medications    No medications on file

## 2025-05-14 ENCOUNTER — OFFICE VISIT (OUTPATIENT)
Dept: NEUROLOGY | Facility: CLINIC | Age: 80
End: 2025-05-14
Payer: COMMERCIAL

## 2025-05-14 VITALS — DIASTOLIC BLOOD PRESSURE: 69 MMHG | HEART RATE: 60 BPM | SYSTOLIC BLOOD PRESSURE: 124 MMHG | WEIGHT: 185.3 LBS

## 2025-05-14 DIAGNOSIS — G44.229 CHRONIC TENSION-TYPE HEADACHE, NOT INTRACTABLE: ICD-10-CM

## 2025-05-14 DIAGNOSIS — I67.1 CEREBRAL ANEURYSM, NONRUPTURED: Primary | ICD-10-CM

## 2025-05-14 PROCEDURE — G2211 COMPLEX E/M VISIT ADD ON: HCPCS | Performed by: PSYCHIATRY & NEUROLOGY

## 2025-05-14 PROCEDURE — 3078F DIAST BP <80 MM HG: CPT | Performed by: PSYCHIATRY & NEUROLOGY

## 2025-05-14 PROCEDURE — 99214 OFFICE O/P EST MOD 30 MIN: CPT | Performed by: PSYCHIATRY & NEUROLOGY

## 2025-05-14 PROCEDURE — 3074F SYST BP LT 130 MM HG: CPT | Performed by: PSYCHIATRY & NEUROLOGY

## 2025-05-14 RX ORDER — VENLAFAXINE HYDROCHLORIDE 37.5 MG/1
37.5 CAPSULE, EXTENDED RELEASE ORAL DAILY
Qty: 30 CAPSULE | Refills: 6 | Status: SHIPPED | OUTPATIENT
Start: 2025-05-14

## 2025-05-14 NOTE — LETTER
2025      Nery Fontanez  [address] [facility] [address] [facility] [address]      Dear Colleague,    Thank you for referring your patient, Nery Fontanez, to the [facility] [place]. Please see a copy of my visit note below.    NEUROLOGY FOLLOW UP VISIT  NOTE       [facility] NEUROLOGY [place]  [address] [place] [address]  Tel: [phone]  Fax: [phone]  [email or web address]     Nery Fontanez,  1945, MRN [id number]  PCP: Tony Jimenez  Date: 2025      ASSESSMENT & PLAN     Visit Diagnosis  Cerebral aneurysm, nonruptured  Chronic tension-type headache, not intractable     Tension type headache  H/O right paraophthalmic aneurysm  79-year-old female with history of HTN, HLD, right Bell's palsy, glaucoma, cigarette smoking who was initially seen in our clinic in  for incidental right paraophthalmic aneurysm.  This is being monitored and most recent MRI in  shows a stable3.4 x 3.1 x 2.4 mm, aneurysm similar to .  She had developed pressure-like sensation behind the eye and had lab work, temporal artery ultrasound that was normal.  I have recommended:    1.  Start Effexor 37.5 mg daily  2.  I have encouraged her to follow-up with ophthalmology for her glaucoma that I suspect is a major cause for her headaches 3.  Follow-up in 3 months    Thank you again for this referral, please feel free to contact me if you have any questions.    Reinaldo Sun MD  [facility] NEUROLOGY, [place]     HISTORY OF PRESENT ILLNESS     Patient is a 79-year-old female with history of HTN, HLD, right Bell's palsy, glaucoma, cigarette smoking who was initially seen in our clinic in  and then in  for incidental right paraophthalmic aneurysm.This was being monitored and most recent MRI in  showed a stable 3.4 x 3.1 x 2.4 mm, laterally projecting aneurysm arising from the lateral margin of the anterior genu of the cavernous segment of the internal  carotid artery.      She had developed headache and reported pressure-like sensation behind the eye and sed rate, C-reactive protein, antinuclear antibody and rheumatoid factor were checked & were essentially normal.  Antinuclear antibody was borderline elevated at 1: 40 of no clinical significance.  Ultrasound of the temporal arteries did not show any evidence of giant cell arteritis.  MRI brain, MRA head and neck showed no significant change in the size of the right paraophthalmic aneurysm compared to 2021.  She continues to have headaches but admits if she closes her eyes her headaches do tend to improve.  For abortive treatment she is using Tylenol     PROBLEM LIST   Patient Active Problem List   Diagnosis     Thyroid nodule     Nodule of lower lobe of right lung     Cerebral aneurysm, nonruptured     Benign essential hypertension     Mixed hyperlipidemia     History of Bell's palsy     CKD stage 3a, GFR 45-59 ml/min (H)     Open-angle glaucoma of both eyes, indeterminate stage         PAST MEDICAL & SURGICAL HISTORY     Past Medical History:   Patient  has no past medical history on file.    Surgical History:  She  has a past surgical history that includes Hysterectomy (1991) and Oophorectomy (Bilateral, 1991).     SOCIAL HISTORY     Reviewed, and she  reports that she has quit smoking. Her smoking use included cigarettes. She has never used smokeless tobacco.     FAMILY HISTORY     Reviewed, and family history is not on file.     ALLERGIES     Allergies   Allergen Reactions     Lisinopril Hives and Swelling     Other Drug Allergy (See Comments) Hives     MULTIVIT-IRON-MIN-FOLIC ACID, CENTRUM         REVIEW OF SYSTEMS     A 12 point review of system was performed and was negative except as outlined in the history of present illness.     HOME MEDICATIONS     Current Outpatient Rx   Medication Sig Dispense Refill     ascorbic acid, vitamin C, (ASCORBIC ACID) 250 mg Chew [ASCORBIC ACID, VITAMIN C, (ASCORBIC ACID)  250 MG CHEW] Chew.       aspirin 81 MG EC tablet [ASPIRIN 81 MG EC TABLET] Take 81 mg by mouth daily.       diltiazem (CARDIZEM SR) 120 MG CP12 12 hr SR capsule Take 120 mg by mouth 2 times daily.       losartan (COZAAR) 100 MG tablet [LOSARTAN (COZAAR) 100 MG TABLET] Take 100 mg by mouth daily.       triamterene-hydrochlorothiazide (DYAZIDE) 37.5-25 mg per capsule [TRIAMTERENE-HYDROCHLOROTHIAZIDE (DYAZIDE) 37.5-25 MG PER CAPSULE] Take 1 capsule by mouth every morning.       VEOZAH 45 MG TABS Take 45 mg by mouth once as needed.           PHYSICAL EXAM     Vital signs  /69 (BP Location: Right arm, Patient Position: Sitting, Cuff Size: Adult Regular)   Pulse 60   Wt 84.1 kg (185 lb 4.8 oz)     Weight:   185 lbs 4.8 oz    Elderly female who is alert and oriented x 3 no acute distress. Vital signs are reviewed and documented in electronic medical record. Neck supple. Neurologically speech was normal. Cranial nerves were intact. She has difficulty with reading. No end gaze nystagmus. She has normal mass and tone with 5/5 strength. Sensation intact to light touch pinprick. No dysmetria noted on finger-nose testing. Gait normal Romberg negative     PERTINENT DIAGNOSTIC STUDIES     Following studies were reviewed:     MRI, MRA BRAIN 5/8/2025  1. No acute intracranial pathology.  2. A few T2 hyperintense white matter foci, most likely represents  chronic small vessel ischemic disease.  1. No significant change in right parophthalmic aneurysm projecting  laterally, measuring 3.4 x 3.1 x 2.4 mm, similar to 2021.  2. No significant stenosis noted of the major cervical arteries.     MRA BRAIN 11/9/2023  Redemonstrated laterally projecting right paraophthalmic artery aneurysm measuring 0.3 x 0.4 cm, not significantly changed since 01/06/2020.      MRI BRAIN:1/6/20  1. No finding for intracranial hemorrhage, mass, or acute infarct. Small chronic lacunar infarct is seen within the right caudate.    MRA [place] OF [facility]  1/6/2020  1. Stable roughly 2.2 x 3.2 x 2.8 mm laterally projecting aneurysm arising from the lateral margin of the anterior genu of the cavernous segment of the internal carotid artery on the right, and stable in appearance dating back to 10/28/2016.  2. No other finding for aneurysm, vascular cutoff, or flow-limiting stenosis.     MRI & MRA BRAIN 3/28/2018  HEAD MRI:  1.  No evidence for infarct, hemorrhage, obstructive hydrocephalus or intracranial mass.   2.  Minimal stable generalized cerebral and cerebellar atrophy and minimal chronic small vessel ischemic changes in the cerebral white matter.   3.  Trace mucosal thickening in a few ethmoid air cells.     HEAD MRA:  1.  Head MRA is unchanged from MRA 06/23/2017 with all of the major large-caliber proximal intracranial vessels patent.  2.  Again seen is the small aneurysm directed laterally from the right anterior carotid siphon at the level of the ophthalmic artery. Its appearance is stable. Today it measures approximately 5 mm in length by 3 mm AP. These measurements are larger than   on the 2017 dictation however this is due to measurement technique. Today's measurements are more similar to previous studies in 2016 and 2014. Overall it has not changed. No evidence for new aneurysm elsewhere     MRI BRAIN, MRA BRAIN 9/15/2024  HEAD MRI:  1.  Hazy non-masslike enhancement at the fundus of the right internal auditory canal and involving the labyrinthine and geniculate segments of the right facial nerve. This suggests a facial neuritis and would be consistent with the diagnosis of Bell's  palsy.  2.  Otherwise, no cerebellopontine angle or internal auditory canal mass. No additional pathologic temporal bone enhancement.  3.  No evidence for acute intracranial process. No mass, hemorrhage or recent infarct.  4.  A few nonspecific foci of T2 and FLAIR hyperintensity within the periventricular and deep cerebral white matter, likely sequela of chronic microvascular  ischemic change.     HEAD MRA:  1.  5 x 3 x 5.5 mm laterally projecting aneurysm arising from the proximal supraclinoid right internal carotid artery, potentially paraophthalmic in nature. Consider referral to neuro interventional radiology for consideration of elective treatment.  2.  No evidence for proximal vessel occlusion, high-grade intracranial stenosis or definite additional aneurysms.     NECK MRA:  1.  Mild atherosclerotic change at the carotid bifurcations without evidence for hemodynamically significant carotid arterial stenosis by NASCET criteria. Retropharyngeal course of the proximal internal carotid artery segments.  2.  Dominant left vertebral artery. No evidence for significant cervical vertebral artery stenosis.     ULTRASOUND TEMPORAL ARTERIES 5/8/2025  No evidence of giant cell arteritis    CEREBRAL ANGIOGRAM 10/3/2014       PERTINENT LABS  Following labs were reviewed:  Lab on 04/02/2025   Component Date Value Ref Range Status     Rheumatoid Factor 04/02/2025 <10  <14 IU/mL Final     EMELY interpretation 04/02/2025 Borderline Positive (A)  Negative Final     EMELY pattern 1 04/02/2025 Speckled   Final     EMELY titer 1 04/02/2025 1:40   Final     Erythrocyte Sedimentation Rate 04/02/2025 19  0 - 30 mm/hr Final     CRP Inflammation 04/02/2025 3.18  <5.00 mg/L Final   Lab Requisition on 03/19/2025   Component Date Value Ref Range Status     Sodium 03/19/2025 139  135 - 145 mmol/L Final     Potassium 03/19/2025 4.7  3.4 - 5.3 mmol/L Final     Chloride 03/19/2025 102  98 - 107 mmol/L Final     Carbon Dioxide (CO2) 03/19/2025 25  22 - 29 mmol/L Final     Anion Gap 03/19/2025 12  7 - 15 mmol/L Final     Urea Nitrogen 03/19/2025 19.7  8.0 - 23.0 mg/dL Final     Creatinine 03/19/2025 1.04 (H)  0.51 - 0.95 mg/dL Final     GFR Estimate 03/19/2025 54 (L)  >60 mL/min/1.73m2 Final     Calcium 03/19/2025 10.1  8.8 - 10.4 mg/dL Final     Glucose 03/19/2025 102 (H)  70 - 99 mg/dL Final     Patient Fasting > 8hrs?  03/19/2025 Unknown   Final     Cholesterol 03/19/2025 218 (H)  <200 mg/dL Final     Triglycerides 03/19/2025 83  <150 mg/dL Final     Direct Measure HDL 03/19/2025 76  >=50 mg/dL Final     LDL Cholesterol Calculated 03/19/2025 125 (H)  <100 mg/dL Final     Non HDL Cholesterol 03/19/2025 142 (H)  <130 mg/dL Final     Patient Fasting > 8hrs? 03/19/2025 Unknown   Final         Total time spent for face to face visit, reviewing labs/imaging studies, counseling and coordination of care was: 30 Minutes spent on the date of the encounter doing chart review, review of outside records, review of test results, interpretation of tests, patient visit, and documentation     The longitudinal plan of care for the diagnosis(es)/condition(s) as documented were addressed during this visit. Due to the added complexity in care, I will continue to support Nery in the subsequent management and with ongoing continuity of care.    This note was dictated using voice recognition software.  Any grammatical or context distortions are unintentional and inherent to the software.    No orders of the defined types were placed in this encounter.     New Prescriptions    No medications on file     Modified Medications    No medications on file                 Again, thank you for allowing me to participate in the care of your patient.        Sincerely,        Reinaldo Sun MD    Electronically signed

## 2025-05-16 ENCOUNTER — TELEPHONE (OUTPATIENT)
Dept: NEUROLOGY | Facility: CLINIC | Age: 80
End: 2025-05-16
Payer: COMMERCIAL

## 2025-05-16 NOTE — TELEPHONE ENCOUNTER
[facility] Call Center    Phone Message    May a detailed message be left on voicemail: yes     Reason for Call: Medication Question or concern regarding medication   Prescription Clarification  Name of Medication: venlafaxine (EFFEXOR XR) 37.5 MG 24 hr capsule     Prescribing Provider: Dr. Sun     Pharmacy: [facility] [id number] [address]     What on the order needs clarification? Pt states that the above medication makes them sleepy, and they have had a lack of apatite. Pt is requesting a call back to discuss this.    Please contact pt at [phone]    Action Taken: Message routed to:  [facility]: [facility]    Travel Screening: Not Applicable     Date of Service:

## 2025-05-16 NOTE — TELEPHONE ENCOUNTER
"RN returned call to Nery.    Per chart review, at LOV on 5-14 Dr. Sun prescribed venlafaxine for migraine prevention.    Nery informed RN that she started taking the venlafaxine yesterday and today. She notes the venlafaxine is causing excessive daytime fatigue and decreased appetite. \"I was supposed to go to an event yesterday, but I couldn't muster up enough energy to go.\" She tried taking the venlafaxine with food, but that hasn't helped her symptoms. Nery told RN \"I'm not sure I want to be on this medication. I don't need an antidepressant.\" RN explained that the low dose antidepressant was to help with migraine prevention, however Nery still said she wasn't sure about taking it \"because I don't want to become addicted to it. I'd rather stop now before my body gets used to the drug.\" Nery would like to try something else for migraine prevention - \"I don't want to take an antidepressant.\"    Forwarding to Dr. Sun for review and recommendations.    Joyce Mendez RN, BSN  [facility] Neurology    "

## 2025-05-17 NOTE — TELEPHONE ENCOUNTER
Okay to discontinue Effexor.  Instead of starting another medication I would recommend following up with ophthalmology to address her glaucoma that likely is causing her headaches.  If she is still symptomatic after treatment for her glaucoma we can consider some other medication.

## 2025-05-19 NOTE — TELEPHONE ENCOUNTER
RN called Nery and relayed the following from Dr. Sun:    Okay to discontinue Effexor. Instead of starting another medication I would recommend following up with ophthalmology to address her glaucoma that likely is causing her headaches. If she is still symptomatic after treatment for her glaucoma we can consider some other medication.     Nery expressed understanding.    Joyce Mendez RN, BSN  [facility]

## 2025-07-01 ENCOUNTER — TRANSFERRED RECORDS (OUTPATIENT)
Dept: HEALTH INFORMATION MANAGEMENT | Facility: CLINIC | Age: 80
End: 2025-07-01
Payer: COMMERCIAL

## 2025-07-25 ENCOUNTER — LAB REQUISITION (OUTPATIENT)
Dept: LAB | Facility: CLINIC | Age: 80
End: 2025-07-25

## 2025-07-25 DIAGNOSIS — G44.229 CHRONIC TENSION-TYPE HEADACHE, NOT INTRACTABLE: ICD-10-CM

## 2025-07-25 DIAGNOSIS — R23.2 FLUSHING: ICD-10-CM

## 2025-07-25 DIAGNOSIS — I10 ESSENTIAL (PRIMARY) HYPERTENSION: ICD-10-CM

## 2025-07-25 PROCEDURE — 84443 ASSAY THYROID STIM HORMONE: CPT | Performed by: FAMILY MEDICINE

## 2025-07-25 PROCEDURE — 82248 BILIRUBIN DIRECT: CPT | Performed by: FAMILY MEDICINE

## 2025-07-25 PROCEDURE — 80053 COMPREHEN METABOLIC PANEL: CPT | Performed by: FAMILY MEDICINE

## 2025-07-26 LAB
ALBUMIN SERPL BCG-MCNC: 4.4 G/DL (ref 3.5–5.2)
ALP SERPL-CCNC: 97 U/L (ref 40–150)
ALT SERPL W P-5'-P-CCNC: 14 U/L (ref 0–50)
ANION GAP SERPL CALCULATED.3IONS-SCNC: 12 MMOL/L (ref 7–15)
AST SERPL W P-5'-P-CCNC: 16 U/L (ref 0–45)
BILIRUB DIRECT SERPL-MCNC: 0.31 MG/DL (ref 0–0.45)
BILIRUB SERPL-MCNC: 0.7 MG/DL
BUN SERPL-MCNC: 16.4 MG/DL (ref 8–23)
CALCIUM SERPL-MCNC: 9.9 MG/DL (ref 8.8–10.4)
CHLORIDE SERPL-SCNC: 101 MMOL/L (ref 98–107)
CREAT SERPL-MCNC: 1.07 MG/DL (ref 0.51–0.95)
EGFRCR SERPLBLD CKD-EPI 2021: 53 ML/MIN/1.73M2
GLUCOSE SERPL-MCNC: 92 MG/DL (ref 70–99)
HCO3 SERPL-SCNC: 25 MMOL/L (ref 22–29)
POTASSIUM SERPL-SCNC: 4.3 MMOL/L (ref 3.4–5.3)
PROT SERPL-MCNC: 6.8 G/DL (ref 6.4–8.3)
SODIUM SERPL-SCNC: 138 MMOL/L (ref 135–145)
TSH SERPL DL<=0.005 MIU/L-ACNC: 0.68 UIU/ML (ref 0.3–4.2)

## 2025-08-19 ENCOUNTER — LAB REQUISITION (OUTPATIENT)
Dept: LAB | Facility: CLINIC | Age: 80
End: 2025-08-19

## 2025-08-19 DIAGNOSIS — I10 ESSENTIAL (PRIMARY) HYPERTENSION: ICD-10-CM

## 2025-08-19 PROCEDURE — 80048 BASIC METABOLIC PNL TOTAL CA: CPT | Performed by: FAMILY MEDICINE

## 2025-08-20 LAB
ANION GAP SERPL CALCULATED.3IONS-SCNC: 14 MMOL/L (ref 7–15)
BUN SERPL-MCNC: 17.6 MG/DL (ref 8–23)
CALCIUM SERPL-MCNC: 9.6 MG/DL (ref 8.8–10.4)
CHLORIDE SERPL-SCNC: 100 MMOL/L (ref 98–107)
CREAT SERPL-MCNC: 0.94 MG/DL (ref 0.51–0.95)
EGFRCR SERPLBLD CKD-EPI 2021: 61 ML/MIN/1.73M2
GLUCOSE SERPL-MCNC: 103 MG/DL (ref 70–99)
HCO3 SERPL-SCNC: 25 MMOL/L (ref 22–29)
POTASSIUM SERPL-SCNC: 3.9 MMOL/L (ref 3.4–5.3)
SODIUM SERPL-SCNC: 139 MMOL/L (ref 135–145)

## 2025-08-21 ENCOUNTER — HOSPITAL ENCOUNTER (OUTPATIENT)
Dept: CARDIOLOGY | Facility: CLINIC | Age: 80
End: 2025-08-21
Attending: FAMILY MEDICINE
Payer: COMMERCIAL

## 2025-08-21 DIAGNOSIS — I50.9 ACC/AHA STAGE B HEART FAILURE (H): ICD-10-CM

## 2025-08-21 LAB — LVEF ECHO: NORMAL

## 2025-08-21 PROCEDURE — 93306 TTE W/DOPPLER COMPLETE: CPT
